# Patient Record
Sex: MALE | Race: WHITE | Employment: FULL TIME | URBAN - METROPOLITAN AREA
[De-identification: names, ages, dates, MRNs, and addresses within clinical notes are randomized per-mention and may not be internally consistent; named-entity substitution may affect disease eponyms.]

---

## 2017-10-24 ENCOUNTER — HOSPITAL ENCOUNTER (EMERGENCY)
Age: 57
Discharge: HOME | End: 2017-10-24
Attending: EMERGENCY MEDICINE
Payer: COMMERCIAL

## 2017-10-24 VITALS
BODY MASS INDEX: 39.37 KG/M2 | HEART RATE: 60 BPM | HEIGHT: 70 IN | DIASTOLIC BLOOD PRESSURE: 79 MMHG | OXYGEN SATURATION: 99 % | RESPIRATION RATE: 18 BRPM | WEIGHT: 275 LBS | SYSTOLIC BLOOD PRESSURE: 149 MMHG | TEMPERATURE: 98.1 F

## 2017-10-24 DIAGNOSIS — M62.838 SPASM OF MUSCLE: ICD-10-CM

## 2017-10-24 DIAGNOSIS — S39.012A BACK STRAIN, INITIAL ENCOUNTER: Primary | ICD-10-CM

## 2017-10-24 PROCEDURE — 99283 EMERGENCY DEPT VISIT LOW MDM: CPT

## 2017-10-24 PROCEDURE — 6360000002 HC RX W HCPCS: Performed by: PHYSICIAN ASSISTANT

## 2017-10-24 PROCEDURE — 96372 THER/PROPH/DIAG INJ SC/IM: CPT

## 2017-10-24 RX ORDER — PREDNISONE 20 MG/1
40 TABLET ORAL DAILY
Qty: 10 TABLET | Refills: 0 | Status: SHIPPED | OUTPATIENT
Start: 2017-10-24 | End: 2017-10-29

## 2017-10-24 RX ORDER — CYCLOBENZAPRINE HCL 10 MG
10 TABLET ORAL 2 TIMES DAILY PRN
Qty: 10 TABLET | Refills: 0 | Status: SHIPPED | OUTPATIENT
Start: 2017-10-24 | End: 2017-10-29

## 2017-10-24 RX ORDER — ORPHENADRINE CITRATE 30 MG/ML
60 INJECTION INTRAMUSCULAR; INTRAVENOUS ONCE
Status: COMPLETED | OUTPATIENT
Start: 2017-10-24 | End: 2017-10-24

## 2017-10-24 RX ORDER — KETOROLAC TROMETHAMINE 30 MG/ML
60 INJECTION, SOLUTION INTRAMUSCULAR; INTRAVENOUS ONCE
Status: COMPLETED | OUTPATIENT
Start: 2017-10-24 | End: 2017-10-24

## 2017-10-24 RX ADMIN — KETOROLAC TROMETHAMINE 60 MG: 30 INJECTION, SOLUTION INTRAMUSCULAR at 19:43

## 2017-10-24 RX ADMIN — ORPHENADRINE CITRATE 60 MG: 30 INJECTION INTRAMUSCULAR; INTRAVENOUS at 19:44

## 2017-10-24 ASSESSMENT — ENCOUNTER SYMPTOMS
BACK PAIN: 1
BOWEL INCONTINENCE: 0
ABDOMINAL SWELLING: 0

## 2017-10-24 ASSESSMENT — PAIN SCALES - GENERAL
PAINLEVEL_OUTOF10: 8
PAINLEVEL_OUTOF10: 5

## 2017-10-24 NOTE — ED PROVIDER NOTES
[address]  eMERGENCY dEPARTMENT eNCOUnter      Pt Name: Jessica Roland  MRN: [id number]  [place] 1960  Date of evaluation: 10/24/17      CHIEF COMPLAINT       Chief Complaint   Patient presents with    Back Pain         HISTORY OF PRESENT ILLNESS    Jessica Roland is a 61 y.o. male who presents complaining of back pain    Back Pain   Location:  Sacro-iliac joint  Quality:  Aching and stiffness  Stiffness is present:  All day  Radiates to:  Does not radiate  Pain severity:  Mild  Pain is:  Same all the time  Onset quality:  Sudden  Duration:  1 day (Patient reports he was bending over to get laundry out of the dryer he stood up he felt a sharp pain that almost took him to his knees)  Timing:  Constant  Progression:  Unchanged  Chronicity:  New  Context: twisting    Relieved by:  Being still  Worsened by: Movement  Ineffective treatments:  None tried  Associated symptoms: no abdominal swelling, no bladder incontinence, no bowel incontinence, no dysuria, no leg pain, no numbness, no paresthesias, no pelvic pain, no perianal numbness, no tingling and no weakness        REVIEW OF SYSTEMS       Review of Systems   Gastrointestinal: Negative for bowel incontinence. Genitourinary: Negative for bladder incontinence, dysuria and pelvic pain. Musculoskeletal: Positive for back pain. Neurological: Negative for tingling, weakness, numbness and paresthesias. All other systems reviewed and are negative.       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    Hyperlipidemia     Hypertension        SURGICAL HISTORY       Past Surgical History:   Procedure Laterality Date    FOOT SURGERY Bilateral     bone spurs, ingrown toenails    HERNIA REPAIR      umbilical    LARYNGOSCOPY  09/23/2014    with biopsies of vocal cords    OTHER SURGICAL HISTORY  10-29-14    direct laryngoscopy    TONSILLECTOMY         CURRENT MEDICATIONS       Discharge Medication List as of 10/24/2017  7:54 PM      CONTINUE these medications which have NOT CHANGED    Details   aspirin 325 MG EC tablet Take 325 mg by mouth dailyHistorical Med      minocycline (MINOCIN;DYNACIN) 100 MG capsule Take 1 capsule by mouth 2 times daily. , Disp-60 capsule, R-3Normal      prochlorperazine (COMPAZINE) 10 MG tablet Take 1 tablet by mouth every 6 hours as needed. , Disp-60 tablet, R-1Normal      Multiple Vitamins-Minerals (THERAPEUTIC MULTIVITAMIN-MINERALS) tablet Take 1 tablet by mouth daily. Historical Med      allopurinol (ZYLOPRIM) 300 MG tablet Take 300 mg by mouth daily. Historical Med      atenolol-chlorthalidone (TENORETIC) 100-25 MG per tablet Take 1 tablet by mouth daily. Historical Med      simvastatin (ZOCOR) 40 MG tablet Take 40 mg by mouth nightly. Historical Med             ALLERGIES     is allergic to ceftin [cefuroxime axetil]; pcn [penicillins]; and sulfa antibiotics. FAMILY HISTORY     indicated that his mother is . He indicated that his father is . SOCIAL HISTORY      reports that he has quit smoking. He quit after 15.00 years of use. He has never used smokeless tobacco. He reports that he drinks alcohol. He reports that he does not use drugs. PHYSICAL EXAM     INITIAL VITALS: BP (!) 149/79   Pulse 60   Temp 98.1 °F (36.7 °C) (Oral)   Resp 18   Ht 5' 10\" (1.778 m)   Wt 275 lb (124.7 kg)   SpO2 99%   BMI 39.46 kg/m²      Physical Exam   Constitutional: He is oriented to person, place, and time. He appears well-developed and well-nourished. HENT:   Head: Normocephalic and atraumatic. Cardiovascular: Normal rate, regular rhythm and normal heart sounds. Pulmonary/Chest: Effort normal and breath sounds normal.   Musculoskeletal: He exhibits no edema, tenderness or deformity. Lumbar back: He exhibits decreased range of motion, pain and spasm.  He exhibits no tenderness, no bony tenderness, no swelling and no edema. Back:    Strength is 55 against resistance distally  Reflexes are 2+ bilaterally distally  Sensation is intact distally  Peripheral pulses are palpable distally   Neurological: He is alert and oriented to person, place, and time. MEDICAL DECISION MAKING:         DIAGNOSTIC RESULTS     EKG: All EKG's are interpreted by the Emergency Department Physician who either signs or Co-signs this chart in the absence of a cardiologist.        RADIOLOGY:All plain film, CT, MRI, and formal ultrasound images (except ED bedside ultrasound) are read by the radiologist and the images and interpretations are directly viewed by the emergency physician. LABS: All lab results were reviewed by myself, and all abnormals are listed below. Labs Reviewed - No data to display      EMERGENCY DEPARTMENT COURSE:   Vitals:    Vitals:    10/24/17 1923 10/24/17 1938   BP: (!) 141/79 (!) 149/79   Pulse: 60 60   Resp: 18    Temp: 98.1 °F (36.7 °C)    TempSrc: Oral    SpO2: 99%    Weight: 275 lb (124.7 kg)    Height: 5' 10\" (1.778 m)        The patient was given the following medications while in the emergency department:  Orders Placed This Encounter   Medications    ketorolac (TORADOL) injection 60 mg    orphenadrine (NORFLEX) injection 60 mg    traMADol-acetaminophen (ULTRACET) 37.5-325 MG per tablet     Sig: Take 1 tablet by mouth every 6 hours as needed for Pain     Dispense:  10 tablet     Refill:  0    predniSONE (DELTASONE) 20 MG tablet     Sig: Take 2 tablets by mouth daily for 5 days     Dispense:  10 tablet     Refill:  0    cyclobenzaprine (FLEXERIL) 10 MG tablet     Sig: Take 1 tablet by mouth 2 times daily as needed for Muscle spasms     Dispense:  10 tablet     Refill:  0       -------------------------      CRITICAL CARE:     CONSULTS:  None    PROCEDURES:  Procedures    FINAL IMPRESSION      1. Back strain, initial encounter    2.  Spasm of muscle          DISPOSITION/PLAN   DISPOSITION Decision to Discharge    PATIENT REFERRED TO:  Stanley Fleming MD  [address]  [phone]    Schedule an appointment as soon as possible for a visit in 2 days  As needed    [facility]  [address] [facility]  [phone]    If symptoms worsen      DISCHARGE MEDICATIONS:  Discharge Medication List as of 10/24/2017  7:54 PM      START taking these medications    Details   traMADol-acetaminophen (ULTRACET) 37.5-325 MG per tablet Take 1 tablet by mouth every 6 hours as needed for Pain, Disp-10 tablet, R-0Print      predniSONE (DELTASONE) 20 MG tablet Take 2 tablets by mouth daily for 5 days, Disp-10 tablet, R-0Print      cyclobenzaprine (FLEXERIL) 10 MG tablet Take 1 tablet by mouth 2 times daily as needed for Muscle spasms, Disp-10 tablet, R-0Print             (Please note that portions of this note were completed with a voice recognition program.  Efforts were made to edit the dictations but occasionally words are mis-transcribed.)    ELEANOR Lizarraga PA-C  10/24/17 [id number]

## 2017-10-25 NOTE — ED NOTES
Pt discharged on stable condition. Educated on RX's and discharge instructions reviewed. Pt educated to follow up as recommended. Pt discharged accompanied by family. Pt ambulating well -stable gait. Rates pain to back 3/10.  Pt verbalized understanding to discharge instructions       Amita Samson RN  10/24/17 2008

## 2017-10-25 NOTE — ED PROVIDER NOTES
[address] ED  eMERGENCY dEPARTMENT eNCOUnter   Independent Attestation     Pt Name: Starr Gee  MRN: [id number]  [place] 1960  Date of evaluation: 10/24/17     Starr Gee is a 61 y.o. male with CC: Back Pain      Based on the medical record the care appears appropriate. I was personally available for consultation in the Emergency Department.     Deandra Harper MD  Attending Emergency Physician                 Deandra Harper MD  10/24/17 1012

## 2019-12-02 ENCOUNTER — HOSPITAL ENCOUNTER (EMERGENCY)
Age: 59
Discharge: HOME | End: 2019-12-02
Attending: EMERGENCY MEDICINE
Payer: COMMERCIAL

## 2019-12-02 ENCOUNTER — APPOINTMENT (OUTPATIENT)
Dept: GENERAL RADIOLOGY | Age: 59
End: 2019-12-02
Payer: COMMERCIAL

## 2019-12-02 VITALS
SYSTOLIC BLOOD PRESSURE: 155 MMHG | HEART RATE: 65 BPM | RESPIRATION RATE: 16 BRPM | HEIGHT: 70 IN | BODY MASS INDEX: 41.52 KG/M2 | OXYGEN SATURATION: 95 % | WEIGHT: 290 LBS | TEMPERATURE: 97.7 F | DIASTOLIC BLOOD PRESSURE: 69 MMHG

## 2019-12-02 DIAGNOSIS — J40 BRONCHITIS: Primary | ICD-10-CM

## 2019-12-02 LAB
ABSOLUTE EOS #: 0.17 K/UL (ref 0–0.4)
ABSOLUTE IMMATURE GRANULOCYTE: ABNORMAL K/UL (ref 0–0.3)
ABSOLUTE LYMPH #: 0.83 K/UL (ref 1–4.8)
ABSOLUTE MONO #: 0.88 K/UL (ref 0.1–1.3)
ANION GAP SERPL CALCULATED.3IONS-SCNC: 16 MMOL/L (ref 9–17)
ATYPICAL LYMPHOCYTE ABSOLUTE COUNT: 0.28 K/UL
ATYPICAL LYMPHOCYTES: 5 %
BASOPHILS # BLD: 0 % (ref 0–2)
BASOPHILS ABSOLUTE: 0 K/UL (ref 0–0.2)
BUN BLDV-MCNC: 24 MG/DL (ref 6–20)
BUN/CREAT BLD: ABNORMAL (ref 9–20)
CALCIUM SERPL-MCNC: 9.5 MG/DL (ref 8.6–10.4)
CHLORIDE BLD-SCNC: 100 MMOL/L (ref 98–107)
CO2: 24 MMOL/L (ref 20–31)
CREAT SERPL-MCNC: 1.52 MG/DL (ref 0.7–1.2)
DIFFERENTIAL TYPE: ABNORMAL
DIRECT EXAM: NORMAL
EOSINOPHILS RELATIVE PERCENT: 3 % (ref 0–4)
GFR AFRICAN AMERICAN: 57 ML/MIN
GFR NON-AFRICAN AMERICAN: 47 ML/MIN
GFR SERPL CREATININE-BSD FRML MDRD: ABNORMAL ML/MIN/{1.73_M2}
GFR SERPL CREATININE-BSD FRML MDRD: ABNORMAL ML/MIN/{1.73_M2}
GLUCOSE BLD-MCNC: 129 MG/DL (ref 70–99)
HCT VFR BLD CALC: 42 % (ref 41–53)
HEMOGLOBIN: 14.6 G/DL (ref 13.5–17.5)
IMMATURE GRANULOCYTES: ABNORMAL %
LYMPHOCYTES # BLD: 15 % (ref 24–44)
Lab: NORMAL
MAGNESIUM: 1.7 MG/DL (ref 1.6–2.6)
MCH RBC QN AUTO: 33.7 PG (ref 26–34)
MCHC RBC AUTO-ENTMCNC: 34.8 G/DL (ref 31–37)
MCV RBC AUTO: 96.9 FL (ref 80–100)
MONOCYTES # BLD: 16 % (ref 1–7)
MORPHOLOGY: ABNORMAL
MORPHOLOGY: ABNORMAL
NRBC AUTOMATED: ABNORMAL PER 100 WBC
PDW BLD-RTO: 14.5 % (ref 11.5–14.9)
PLATELET # BLD: 175 K/UL (ref 150–450)
PLATELET ESTIMATE: ABNORMAL
PMV BLD AUTO: 8.1 FL (ref 6–12)
POTASSIUM SERPL-SCNC: 3.5 MMOL/L (ref 3.7–5.3)
RBC # BLD: 4.34 M/UL (ref 4.5–5.9)
RBC # BLD: ABNORMAL 10*6/UL
SEG NEUTROPHILS: 61 % (ref 36–66)
SEGMENTED NEUTROPHILS ABSOLUTE COUNT: 3.34 K/UL (ref 1.3–9.1)
SODIUM BLD-SCNC: 140 MMOL/L (ref 135–144)
SPECIMEN DESCRIPTION: NORMAL
TROPONIN INTERP: NORMAL
TROPONIN INTERP: NORMAL
TROPONIN T: NORMAL NG/ML
TROPONIN T: NORMAL NG/ML
TROPONIN, HIGH SENSITIVITY: 14 NG/L (ref 0–22)
TROPONIN, HIGH SENSITIVITY: 16 NG/L (ref 0–22)
WBC # BLD: 5.5 K/UL (ref 3.5–11)
WBC # BLD: ABNORMAL 10*3/UL

## 2019-12-02 PROCEDURE — 87804 INFLUENZA ASSAY W/OPTIC: CPT

## 2019-12-02 PROCEDURE — 83735 ASSAY OF MAGNESIUM: CPT

## 2019-12-02 PROCEDURE — 84484 ASSAY OF TROPONIN QUANT: CPT

## 2019-12-02 PROCEDURE — 71046 X-RAY EXAM CHEST 2 VIEWS: CPT

## 2019-12-02 PROCEDURE — 85025 COMPLETE CBC W/AUTO DIFF WBC: CPT

## 2019-12-02 PROCEDURE — 36415 COLL VENOUS BLD VENIPUNCTURE: CPT

## 2019-12-02 PROCEDURE — 93005 ELECTROCARDIOGRAM TRACING: CPT | Performed by: STUDENT IN AN ORGANIZED HEALTH CARE EDUCATION/TRAINING PROGRAM

## 2019-12-02 PROCEDURE — 99285 EMERGENCY DEPT VISIT HI MDM: CPT

## 2019-12-02 PROCEDURE — 80048 BASIC METABOLIC PNL TOTAL CA: CPT

## 2019-12-02 RX ORDER — GUAIFENESIN AND CODEINE PHOSPHATE 100; 10 MG/5ML; MG/5ML
5 SOLUTION ORAL 3 TIMES DAILY PRN
Qty: 1 BOTTLE | Refills: 0 | Status: SHIPPED | OUTPATIENT
Start: 2019-12-02 | End: 2019-12-05

## 2019-12-02 ASSESSMENT — PAIN SCALES - GENERAL: PAINLEVEL_OUTOF10: 6

## 2019-12-03 LAB
EKG ATRIAL RATE: 70 BPM
EKG P AXIS: 31 DEGREES
EKG P-R INTERVAL: 202 MS
EKG Q-T INTERVAL: 392 MS
EKG QRS DURATION: 106 MS
EKG QTC CALCULATION (BAZETT): 423 MS
EKG R AXIS: -87 DEGREES
EKG T AXIS: 56 DEGREES
EKG VENTRICULAR RATE: 70 BPM

## 2019-12-03 PROCEDURE — 93010 ELECTROCARDIOGRAM REPORT: CPT | Performed by: INTERNAL MEDICINE

## 2020-08-26 ENCOUNTER — TELEPHONE (OUTPATIENT)
Dept: ORTHOPEDIC SURGERY | Age: 60
End: 2020-08-26

## 2020-08-28 ENCOUNTER — OFFICE VISIT (OUTPATIENT)
Dept: ORTHOPEDIC SURGERY | Age: 60
End: 2020-08-28
Payer: COMMERCIAL

## 2020-08-28 VITALS — WEIGHT: 290 LBS | BODY MASS INDEX: 41.61 KG/M2

## 2020-08-28 PROCEDURE — G8427 DOCREV CUR MEDS BY ELIG CLIN: HCPCS | Performed by: PHYSICIAN ASSISTANT

## 2020-08-28 PROCEDURE — 3017F COLORECTAL CA SCREEN DOC REV: CPT | Performed by: PHYSICIAN ASSISTANT

## 2020-08-28 PROCEDURE — G8417 CALC BMI ABV UP PARAM F/U: HCPCS | Performed by: PHYSICIAN ASSISTANT

## 2020-08-28 PROCEDURE — 99203 OFFICE O/P NEW LOW 30 MIN: CPT | Performed by: PHYSICIAN ASSISTANT

## 2020-08-28 PROCEDURE — 1036F TOBACCO NON-USER: CPT | Performed by: PHYSICIAN ASSISTANT

## 2020-08-28 ASSESSMENT — ENCOUNTER SYMPTOMS
NAUSEA: 0
VOMITING: 0
COLOR CHANGE: 0

## 2020-08-28 NOTE — PROGRESS NOTES
Patient ID: Charly Ly is a 60 y.o. male. Chief Complaint   Patient presents with    New Patient     Rt knee swollen        HPI  Mr. Filomena Jones is a 26-year-old gentleman who presents for evaluation of acute on chronic right knee pain and swelling. States he has had pain in this knee intermittently over the past several years and has undergone several rounds of cortisone injections however over the past month his pain has significantly worsened. Associated with large amount of swelling. Patient cannot recall any specific injury or trauma prior to the onset of his worsening pain. Pain is most severe to the medial aspect of the right knee and is aggravated by any attempt at weightbearing or walking. He states he has had to significantly decrease his activity level because of this. He denies any knee joint warmth, redness, fever or chills. 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    Hyperlipidemia     Hypertension      Past Surgical History:   Procedure Laterality Date    FOOT SURGERY Bilateral     bone spurs, ingrown toenails    HERNIA REPAIR      umbilical    LARYNGOSCOPY  09/23/2014    with biopsies of vocal cords    OTHER SURGICAL HISTORY  10-29-14    direct laryngoscopy    TONSILLECTOMY       Family History   Problem Relation Age of Onset    Cancer Mother      Social History     Occupational History    Not on file   Tobacco Use    Smoking status: Former Smoker     Years: 15.00    Smokeless tobacco: Never Used   Substance and Sexual Activity    Alcohol use: Not Currently     Comment: socially     Drug use: No    Sexual activity: Not on file        Review of Systems   Constitutional: Negative for chills and fever. Cardiovascular: Negative for chest pain. Gastrointestinal: Negative for nausea and vomiting. Musculoskeletal: Positive for arthralgias (Right knee) and joint swelling. Negative for gait problem. end a prescription for Voltaren 75 mg to be taken twice a day with meals over the course of the next 2 weeks was electronically sent to his pharmacy. An aspiration and cortisone injection which was administered as outlined below. him will be contacted with MRI results and follow-up will be arranged from there. Procedure: Right knee aspiration and injection  An informed verbal consent for the procedure was obtained and risks including, but not limited to: allergy to medications, injection, bleeding, stiffness of joint, recurrence of symptoms, loss of function, swelling, drainage, irrigation, need for surgery and pseudo-septic inflammation, were explained to the patient. Also, discussed was the potential for further injections, irrigation and debridement and surgery. Alternate means of treatment have also been discussed with the patient. Under sterile conditions the superolateral portal left knee is prepped with Betadine and alcohol. Using a 25-gauge needle 4 cc of lidocaine is injected superficially. 16 Cc of clear, straw-colored synovial fluid is aspirated from the knee joint subsequently using a 21-gauge needle 2 cc of Celestone is injected into the left knee. A bandage is applied to the injection site. Patient tolerated procedure well. No orders of the defined types were placed in this encounter. Norman Marie    Please note that this chart was generated using voicerecognition Dragon dictation software. Although every effort was made to ensurethe accuracy of this automated transcription, some errors in transcription may haveoccurred.

## 2020-09-03 ENCOUNTER — HOSPITAL ENCOUNTER (OUTPATIENT)
Dept: MRI IMAGING | Age: 60
Discharge: HOME | End: 2020-09-05
Payer: COMMERCIAL

## 2020-09-03 PROCEDURE — 73721 MRI JNT OF LWR EXTRE W/O DYE: CPT

## 2020-09-11 ENCOUNTER — OFFICE VISIT (OUTPATIENT)
Dept: ORTHOPEDIC SURGERY | Age: 60
End: 2020-09-11
Payer: COMMERCIAL

## 2020-09-11 VITALS — HEIGHT: 70 IN | WEIGHT: 290 LBS | BODY MASS INDEX: 41.52 KG/M2

## 2020-09-11 PROCEDURE — 3017F COLORECTAL CA SCREEN DOC REV: CPT | Performed by: ORTHOPAEDIC SURGERY

## 2020-09-11 PROCEDURE — G8417 CALC BMI ABV UP PARAM F/U: HCPCS | Performed by: ORTHOPAEDIC SURGERY

## 2020-09-11 PROCEDURE — G8427 DOCREV CUR MEDS BY ELIG CLIN: HCPCS | Performed by: ORTHOPAEDIC SURGERY

## 2020-09-11 PROCEDURE — 99213 OFFICE O/P EST LOW 20 MIN: CPT | Performed by: ORTHOPAEDIC SURGERY

## 2020-09-11 PROCEDURE — 1036F TOBACCO NON-USER: CPT | Performed by: ORTHOPAEDIC SURGERY

## 2020-09-11 NOTE — PROGRESS NOTES
Mala Drake M.D.            [address] [place] [address], [address] [facility],[address], [address] [facility]           Dept Phone: [phone]           Dept Fax:  [address] [place]          Dept Phone: [phone]           Dept Fax:  [phone]      Chief Compliant:  Chief Complaint   Patient presents with    Follow-up     MRI results on RT knee        History of Present Illness: This is a 60 y.o. male who presents to the clinic today for evaluation / follow up of right knee pain. Please see Anabella Rivera's previous dictation. Patient had a episode of pain and swelling his right knee does not recall specific injury or trauma. He does do a lot of heavy lifting at work. He was seen by Anabella Bergman in late August and an MRI was obtained last week the MRI is consistent with multidirectional tearing of the posterior horn of the medial meniscus as well as a grade 1 sprain of the MCL and some mild to moderate early severe chondromalacia of the medial femoral condyle. Review of Systems   Constitutional: Negative for fever, chills, sweats. Eyes: Negative for changes in vision, or pain. HENT: Negative for ear ache, epistaxis, or sore throat. Respiratory/Cardio: Negative for Chest pain, palpitations, SOB, or cough. Gastrointestinal: Negative for abdominal pain, N/V/D. Genitourinary: Negative for dysuria, frequency, urgency, or hematuria. Neurological: Negative for headache, numbness, or weakness. Integumentary: Negative for rash, itching, laceration, or abrasion. Musculoskeletal: Positive for Follow-up (MRI results on RT knee)       Physical Exam:  Constitutional: Patient is oriented to person, place, and time. Patient appears well-developed and well nourished.    HENT: Negative otherwise noted  Head: Normocephalic and Atraumatic  Nose: Normal  Eyes: Conjunctivae and EOM are normal  Neck: Normal range of motion Neck supple. Respiratory/Cardio: Effort normal. No respiratory distress. Musculoskeletal: Examination notes the patient is wearing a Breg brace. Within the brace is able to do a Parisa's on him which was indeed positive medially. Major examination unremarkable. Neurological: Patient is alert and oriented to person, place, and time. Normal strenght. No sensory deficit. Skin: Skin is warm and dry  Psychiatric: Behavior is normal. Thought content normal.  Nursing note and vitals reviewed. Labs and Imaging:     XR taken today:  No results found. No orders of the defined types were placed in this encounter. Assessment and Plan:  1. Primary osteoarthritis of right knee    2. Tear of medial meniscus of right knee, current, unspecified tear type, initial encounter            This is a 60 y.o. male who presents to the clinic today for evaluation / follow up of medial meniscus tear right knee. Past History:    Current Outpatient Medications:     aspirin 325 MG EC tablet, Take 325 mg by mouth daily, Disp: , Rfl:     minocycline (MINOCIN;DYNACIN) 100 MG capsule, Take 1 capsule by mouth 2 times daily. , Disp: 60 capsule, Rfl: 3    prochlorperazine (COMPAZINE) 10 MG tablet, Take 1 tablet by mouth every 6 hours as needed. , Disp: 60 tablet, Rfl: 1    Multiple Vitamins-Minerals (THERAPEUTIC MULTIVITAMIN-MINERALS) tablet, Take 1 tablet by mouth daily. , Disp: , Rfl:     allopurinol (ZYLOPRIM) 300 MG tablet, Take 300 mg by mouth daily. , Disp: , Rfl:     simvastatin (ZOCOR) 40 MG tablet, Take 40 mg by mouth nightly., Disp: , Rfl:     atenolol-chlorthalidone (TENORETIC) 100-25 MG per tablet, Take 1 tablet by mouth daily. , Disp: , Rfl:   Allergies   Allergen Reactions    Ceftin [Cefuroxime Axetil]      hives    Pcn [Penicillins]      hives    Sulfa Antibiotics      hives     Social History     Socioeconomic History    Marital status:      Spouse name: Not on file    Number of children: Not on file    Years of education: Not on file    Highest education level: Not on file   Occupational History    Not on file   Social Needs    Financial resource strain: Not on file    Food insecurity     Worry: Not on file     Inability: Not on file    Transportation needs     Medical: Not on file     Non-medical: Not on file   Tobacco Use    Smoking status: Former Smoker     Years: 15.00    Smokeless tobacco: Never Used   Substance and Sexual Activity    Alcohol use: Not Currently     Comment: socially     Drug use: No    Sexual activity: Not on file   Lifestyle    Physical activity     Days per week: Not on file     Minutes per session: Not on file    Stress: Not on file   Relationships    Social connections     Talks on phone: Not on file     Gets together: Not on file     Attends Alevism service: Not on file     Active member of club or organization: Not on file     Attends meetings of clubs or organizations: Not on file     Relationship status: Not on file    Intimate partner violence     Fear of current or ex partner: Not on file     Emotionally abused: Not on file     Physically abused: Not on file     Forced sexual activity: Not on file   Other Topics Concern    Not on file   Social History Narrative    Not on file     Past Medical History:   Diagnosis Date    Arthritis     gout    Cancer ([facility] [address]     left vocal cord    Hyperlipidemia     Hypertension      Past Surgical History:   Procedure Laterality Date    FOOT SURGERY Bilateral     bone spurs, ingrown toenails    HERNIA REPAIR      umbilical    LARYNGOSCOPY  09/23/2014    with biopsies of vocal cords    OTHER SURGICAL HISTORY  10-29-14    direct laryngoscopy    TONSILLECTOMY       Family History   Problem Relation Age of Onset    Cancer Mother    Plan  Patient was advised that he would benefit from arthroscopic evaluation treatment he does wish to proceed.   We

## 2020-09-16 ENCOUNTER — TELEPHONE (OUTPATIENT)
Dept: ORTHOPEDIC SURGERY | Age: 60
End: 2020-09-16

## 2020-09-16 NOTE — TELEPHONE ENCOUNTER
PT desires to know if Dr ADAMS [facility] has already filled out  His part of the disability paperwork. PT was going back to work and wants a call back on the progress.   Ph. [phone]

## 2020-09-16 NOTE — TELEPHONE ENCOUNTER
Called patient and he stated he was seen last Friday in [facility] and gave them the paperwork to have filled out. He would like a call back on the status of the paperwork.

## 2020-09-29 DIAGNOSIS — Z01.818 PRE-OP TESTING: Primary | ICD-10-CM

## 2020-09-29 NOTE — PROGRESS NOTES
Preoperative Instructions:    Stop eating solid foods at midnight the night prior to your surgery. Stop drinking clear liquids at midnight the night prior to your surgery. Arrive at the surgery center (3rd entrance) on ___[id number]____________ by ___3730 wearing a mask.____________. Please stop any blood thinning medications as directed by your surgeon or prescribing physician. Failure to stop certain medications may interfere with your scheduled surgery. These may include: Aspirin, Coumadin, Plavix, NSAIDS (Motrin, Aleve, Advil, Mobic, Celebrex), Eliquis, Pradaxa, Xarelto, Fish oil, and herbal supplements. HOLD ASPIRIN as directed. You may continue the rest of your medications through the night before surgery unless instructed otherwise. Day of surgery please take only the following medication(s) with a small sip of water:Atenolol    Please shower with Hibiclens soap( Preferred) or antibacterial soap the night before and the morning of your surgery. Reminders:  -If you are going home the day of your procedure, you will need a family member or friend to stay during the procedure and drive you home after your procedure. Your  must be 25years of age or older and able to sign off on your discharge instructions.    -If you are going home the same day of your surgery, someone must remain with you for the first 24 hours after your surgery if you receive sedation or anesthesia.      -Please do not wear any jewelery or body piercing the day of surgery

## 2020-10-01 ENCOUNTER — HOSPITAL ENCOUNTER (OUTPATIENT)
Dept: PREADMISSION TESTING | Age: 60
Setting detail: SPECIMEN
Discharge: HOME | End: 2020-10-05
Payer: COMMERCIAL

## 2020-10-01 ENCOUNTER — HOSPITAL ENCOUNTER (OUTPATIENT)
Age: 60
Discharge: HOME | End: 2020-10-01
Payer: COMMERCIAL

## 2020-10-01 LAB
ANION GAP SERPL CALCULATED.3IONS-SCNC: 12 MMOL/L (ref 9–17)
BUN BLDV-MCNC: 21 MG/DL (ref 8–23)
BUN/CREAT BLD: ABNORMAL (ref 9–20)
CALCIUM SERPL-MCNC: 10 MG/DL (ref 8.6–10.4)
CHLORIDE BLD-SCNC: 101 MMOL/L (ref 98–107)
CO2: 27 MMOL/L (ref 20–31)
CREAT SERPL-MCNC: 1.37 MG/DL (ref 0.7–1.2)
EKG ATRIAL RATE: 52 BPM
EKG P AXIS: 42 DEGREES
EKG P-R INTERVAL: 228 MS
EKG Q-T INTERVAL: 446 MS
EKG QRS DURATION: 106 MS
EKG QTC CALCULATION (BAZETT): 414 MS
EKG R AXIS: -73 DEGREES
EKG T AXIS: 64 DEGREES
EKG VENTRICULAR RATE: 52 BPM
GFR AFRICAN AMERICAN: >60 ML/MIN
GFR NON-AFRICAN AMERICAN: 53 ML/MIN
GFR SERPL CREATININE-BSD FRML MDRD: ABNORMAL ML/MIN/{1.73_M2}
GFR SERPL CREATININE-BSD FRML MDRD: ABNORMAL ML/MIN/{1.73_M2}
GLUCOSE BLD-MCNC: 110 MG/DL (ref 70–99)
POTASSIUM SERPL-SCNC: 3.8 MMOL/L (ref 3.7–5.3)
SODIUM BLD-SCNC: 140 MMOL/L (ref 135–144)

## 2020-10-01 PROCEDURE — 93005 ELECTROCARDIOGRAM TRACING: CPT | Performed by: ANESTHESIOLOGY

## 2020-10-01 PROCEDURE — 80048 BASIC METABOLIC PNL TOTAL CA: CPT

## 2020-10-01 PROCEDURE — 36415 COLL VENOUS BLD VENIPUNCTURE: CPT

## 2020-10-01 PROCEDURE — U0003 INFECTIOUS AGENT DETECTION BY NUCLEIC ACID (DNA OR RNA); SEVERE ACUTE RESPIRATORY SYNDROME CORONAVIRUS 2 (SARS-COV-2) (CORONAVIRUS DISEASE [COVID-19]), AMPLIFIED PROBE TECHNIQUE, MAKING USE OF HIGH THROUGHPUT TECHNOLOGIES AS DESCRIBED BY CMS-2020-01-R: HCPCS

## 2020-10-02 ENCOUNTER — ANESTHESIA EVENT (OUTPATIENT)
Dept: OPERATING ROOM | Age: 60
End: 2020-10-02
Payer: COMMERCIAL

## 2020-10-02 LAB — SARS-COV-2, NAA: NOT DETECTED

## 2020-10-05 ENCOUNTER — ANESTHESIA (OUTPATIENT)
Dept: OPERATING ROOM | Age: 60
End: 2020-10-05
Payer: COMMERCIAL

## 2020-10-05 ENCOUNTER — HOSPITAL ENCOUNTER (OUTPATIENT)
Age: 60
Setting detail: OUTPATIENT SURGERY
Discharge: HOME | End: 2020-10-05
Attending: ORTHOPAEDIC SURGERY | Admitting: ORTHOPAEDIC SURGERY
Payer: COMMERCIAL

## 2020-10-05 VITALS
HEIGHT: 70 IN | OXYGEN SATURATION: 97 % | HEART RATE: 57 BPM | RESPIRATION RATE: 10 BRPM | BODY MASS INDEX: 39.88 KG/M2 | DIASTOLIC BLOOD PRESSURE: 82 MMHG | SYSTOLIC BLOOD PRESSURE: 136 MMHG | WEIGHT: 278.6 LBS | TEMPERATURE: 97 F

## 2020-10-05 VITALS — TEMPERATURE: 96.6 F | OXYGEN SATURATION: 94 % | SYSTOLIC BLOOD PRESSURE: 113 MMHG | DIASTOLIC BLOOD PRESSURE: 66 MMHG

## 2020-10-05 PROCEDURE — 2500000003 HC RX 250 WO HCPCS: Performed by: NURSE ANESTHETIST, CERTIFIED REGISTERED

## 2020-10-05 PROCEDURE — 7100000001 HC PACU RECOVERY - ADDTL 15 MIN: Performed by: ORTHOPAEDIC SURGERY

## 2020-10-05 PROCEDURE — 29881 ARTHRS KNE SRG MNISECTMY M/L: CPT | Performed by: ORTHOPAEDIC SURGERY

## 2020-10-05 PROCEDURE — 3600000014 HC SURGERY LEVEL 4 ADDTL 15MIN: Performed by: ORTHOPAEDIC SURGERY

## 2020-10-05 PROCEDURE — 3600000004 HC SURGERY LEVEL 4 BASE: Performed by: ORTHOPAEDIC SURGERY

## 2020-10-05 PROCEDURE — 6370000000 HC RX 637 (ALT 250 FOR IP)

## 2020-10-05 PROCEDURE — 6360000002 HC RX W HCPCS: Performed by: NURSE ANESTHETIST, CERTIFIED REGISTERED

## 2020-10-05 PROCEDURE — 7100000000 HC PACU RECOVERY - FIRST 15 MIN: Performed by: ORTHOPAEDIC SURGERY

## 2020-10-05 PROCEDURE — 7100000011 HC PHASE II RECOVERY - ADDTL 15 MIN: Performed by: ORTHOPAEDIC SURGERY

## 2020-10-05 PROCEDURE — 7100000010 HC PHASE II RECOVERY - FIRST 15 MIN: Performed by: ORTHOPAEDIC SURGERY

## 2020-10-05 PROCEDURE — 6360000002 HC RX W HCPCS

## 2020-10-05 PROCEDURE — 2709999900 HC NON-CHARGEABLE SUPPLY: Performed by: ORTHOPAEDIC SURGERY

## 2020-10-05 PROCEDURE — 6360000002 HC RX W HCPCS: Performed by: ORTHOPAEDIC SURGERY

## 2020-10-05 PROCEDURE — 2580000003 HC RX 258: Performed by: ANESTHESIOLOGY

## 2020-10-05 PROCEDURE — 3700000001 HC ADD 15 MINUTES (ANESTHESIA): Performed by: ORTHOPAEDIC SURGERY

## 2020-10-05 PROCEDURE — 3700000000 HC ANESTHESIA ATTENDED CARE: Performed by: ORTHOPAEDIC SURGERY

## 2020-10-05 RX ORDER — DIPHENHYDRAMINE HYDROCHLORIDE 50 MG/ML
12.5 INJECTION INTRAMUSCULAR; INTRAVENOUS
Status: DISCONTINUED | OUTPATIENT
Start: 2020-10-05 | End: 2020-10-05 | Stop reason: HOSPADM

## 2020-10-05 RX ORDER — SODIUM CHLORIDE 0.9 % (FLUSH) 0.9 %
10 SYRINGE (ML) INJECTION PRN
Status: DISCONTINUED | OUTPATIENT
Start: 2020-10-05 | End: 2020-10-05 | Stop reason: HOSPADM

## 2020-10-05 RX ORDER — DEXAMETHASONE SODIUM PHOSPHATE 10 MG/ML
INJECTION, SOLUTION INTRAMUSCULAR; INTRAVENOUS PRN
Status: DISCONTINUED | OUTPATIENT
Start: 2020-10-05 | End: 2020-10-05 | Stop reason: SDUPTHER

## 2020-10-05 RX ORDER — ROPIVACAINE HYDROCHLORIDE 5 MG/ML
INJECTION, SOLUTION EPIDURAL; INFILTRATION; PERINEURAL PRN
Status: DISCONTINUED | OUTPATIENT
Start: 2020-10-05 | End: 2020-10-05 | Stop reason: ALTCHOICE

## 2020-10-05 RX ORDER — LIDOCAINE HYDROCHLORIDE 10 MG/ML
1 INJECTION, SOLUTION EPIDURAL; INFILTRATION; INTRACAUDAL; PERINEURAL
Status: DISCONTINUED | OUTPATIENT
Start: 2020-10-05 | End: 2020-10-05 | Stop reason: HOSPADM

## 2020-10-05 RX ORDER — SODIUM CHLORIDE 0.9 % (FLUSH) 0.9 %
10 SYRINGE (ML) INJECTION EVERY 12 HOURS SCHEDULED
Status: DISCONTINUED | OUTPATIENT
Start: 2020-10-05 | End: 2020-10-05 | Stop reason: HOSPADM

## 2020-10-05 RX ORDER — MORPHINE SULFATE 1 MG/ML
1 INJECTION, SOLUTION EPIDURAL; INTRATHECAL; INTRAVENOUS EVERY 5 MIN PRN
Status: DISCONTINUED | OUTPATIENT
Start: 2020-10-05 | End: 2020-10-05 | Stop reason: HOSPADM

## 2020-10-05 RX ORDER — ONDANSETRON 2 MG/ML
INJECTION INTRAMUSCULAR; INTRAVENOUS PRN
Status: DISCONTINUED | OUTPATIENT
Start: 2020-10-05 | End: 2020-10-05 | Stop reason: SDUPTHER

## 2020-10-05 RX ORDER — SODIUM CHLORIDE, SODIUM LACTATE, POTASSIUM CHLORIDE, CALCIUM CHLORIDE 600; 310; 30; 20 MG/100ML; MG/100ML; MG/100ML; MG/100ML
INJECTION, SOLUTION INTRAVENOUS CONTINUOUS
Status: DISCONTINUED | OUTPATIENT
Start: 2020-10-05 | End: 2020-10-05 | Stop reason: HOSPADM

## 2020-10-05 RX ORDER — HYDROCODONE BITARTRATE AND ACETAMINOPHEN 5; 325 MG/1; MG/1
1 TABLET ORAL EVERY 4 HOURS PRN
Qty: 30 TABLET | Refills: 0 | Status: SHIPPED | OUTPATIENT
Start: 2020-10-05 | End: 2020-10-10

## 2020-10-05 RX ORDER — HYDRALAZINE HYDROCHLORIDE 20 MG/ML
5 INJECTION INTRAMUSCULAR; INTRAVENOUS EVERY 10 MIN PRN
Status: DISCONTINUED | OUTPATIENT
Start: 2020-10-05 | End: 2020-10-05 | Stop reason: HOSPADM

## 2020-10-05 RX ORDER — PROPOFOL 10 MG/ML
INJECTION, EMULSION INTRAVENOUS PRN
Status: DISCONTINUED | OUTPATIENT
Start: 2020-10-05 | End: 2020-10-05 | Stop reason: SDUPTHER

## 2020-10-05 RX ORDER — FENTANYL CITRATE 50 UG/ML
25 INJECTION, SOLUTION INTRAMUSCULAR; INTRAVENOUS EVERY 5 MIN PRN
Status: DISCONTINUED | OUTPATIENT
Start: 2020-10-05 | End: 2020-10-05 | Stop reason: HOSPADM

## 2020-10-05 RX ORDER — HYDROCODONE BITARTRATE AND ACETAMINOPHEN 5; 325 MG/1; MG/1
1 TABLET ORAL PRN
Status: COMPLETED | OUTPATIENT
Start: 2020-10-05 | End: 2020-10-05

## 2020-10-05 RX ORDER — MEPERIDINE HYDROCHLORIDE 50 MG/ML
12.5 INJECTION INTRAMUSCULAR; INTRAVENOUS; SUBCUTANEOUS EVERY 5 MIN PRN
Status: DISCONTINUED | OUTPATIENT
Start: 2020-10-05 | End: 2020-10-05 | Stop reason: HOSPADM

## 2020-10-05 RX ORDER — HYDROCODONE BITARTRATE AND ACETAMINOPHEN 5; 325 MG/1; MG/1
TABLET ORAL
Status: COMPLETED
Start: 2020-10-05 | End: 2020-10-05

## 2020-10-05 RX ORDER — ROPIVACAINE HYDROCHLORIDE 5 MG/ML
INJECTION, SOLUTION EPIDURAL; INFILTRATION; PERINEURAL
Status: DISCONTINUED
Start: 2020-10-05 | End: 2020-10-05 | Stop reason: HOSPADM

## 2020-10-05 RX ORDER — FENTANYL CITRATE 50 UG/ML
INJECTION, SOLUTION INTRAMUSCULAR; INTRAVENOUS
Status: COMPLETED
Start: 2020-10-05 | End: 2020-10-05

## 2020-10-05 RX ORDER — PROMETHAZINE HYDROCHLORIDE 25 MG/ML
6.25 INJECTION, SOLUTION INTRAMUSCULAR; INTRAVENOUS
Status: DISCONTINUED | OUTPATIENT
Start: 2020-10-05 | End: 2020-10-05 | Stop reason: HOSPADM

## 2020-10-05 RX ORDER — MIDAZOLAM HYDROCHLORIDE 1 MG/ML
INJECTION INTRAMUSCULAR; INTRAVENOUS PRN
Status: DISCONTINUED | OUTPATIENT
Start: 2020-10-05 | End: 2020-10-05 | Stop reason: SDUPTHER

## 2020-10-05 RX ORDER — HYDROCODONE BITARTRATE AND ACETAMINOPHEN 5; 325 MG/1; MG/1
2 TABLET ORAL PRN
Status: COMPLETED | OUTPATIENT
Start: 2020-10-05 | End: 2020-10-05

## 2020-10-05 RX ORDER — FENTANYL CITRATE 50 UG/ML
INJECTION, SOLUTION INTRAMUSCULAR; INTRAVENOUS PRN
Status: DISCONTINUED | OUTPATIENT
Start: 2020-10-05 | End: 2020-10-05 | Stop reason: SDUPTHER

## 2020-10-05 RX ORDER — LIDOCAINE HYDROCHLORIDE 10 MG/ML
INJECTION, SOLUTION EPIDURAL; INFILTRATION; INTRACAUDAL; PERINEURAL PRN
Status: DISCONTINUED | OUTPATIENT
Start: 2020-10-05 | End: 2020-10-05 | Stop reason: SDUPTHER

## 2020-10-05 RX ORDER — ONDANSETRON 2 MG/ML
4 INJECTION INTRAMUSCULAR; INTRAVENOUS
Status: DISCONTINUED | OUTPATIENT
Start: 2020-10-05 | End: 2020-10-05 | Stop reason: HOSPADM

## 2020-10-05 RX ADMIN — FENTANYL CITRATE 50 MCG: 50 INJECTION, SOLUTION INTRAMUSCULAR; INTRAVENOUS at 09:41

## 2020-10-05 RX ADMIN — SODIUM CHLORIDE, POTASSIUM CHLORIDE, SODIUM LACTATE AND CALCIUM CHLORIDE: 600; 310; 30; 20 INJECTION, SOLUTION INTRAVENOUS at 07:51

## 2020-10-05 RX ADMIN — FENTANYL CITRATE 50 MCG: 50 INJECTION INTRAMUSCULAR; INTRAVENOUS at 08:52

## 2020-10-05 RX ADMIN — DEXAMETHASONE SODIUM PHOSPHATE 10 MG: 10 INJECTION, SOLUTION INTRAMUSCULAR; INTRAVENOUS at 08:51

## 2020-10-05 RX ADMIN — MIDAZOLAM HYDROCHLORIDE 2 MG: 1 INJECTION, SOLUTION INTRAMUSCULAR; INTRAVENOUS at 08:37

## 2020-10-05 RX ADMIN — HYDROCODONE BITARTRATE AND ACETAMINOPHEN 1 TABLET: 5; 325 TABLET ORAL at 09:56

## 2020-10-05 RX ADMIN — PROPOFOL 200 MG: 10 INJECTION, EMULSION INTRAVENOUS at 08:41

## 2020-10-05 RX ADMIN — FENTANYL CITRATE 50 MCG: 50 INJECTION INTRAMUSCULAR; INTRAVENOUS at 08:41

## 2020-10-05 RX ADMIN — ONDANSETRON 4 MG: 2 INJECTION, SOLUTION INTRAMUSCULAR; INTRAVENOUS at 09:14

## 2020-10-05 RX ADMIN — LIDOCAINE HYDROCHLORIDE 50 MG: 10 INJECTION, SOLUTION EPIDURAL; INFILTRATION; INTRACAUDAL; PERINEURAL at 08:41

## 2020-10-05 ASSESSMENT — PAIN SCALES - GENERAL
PAINLEVEL_OUTOF10: 2
PAINLEVEL_OUTOF10: 0
PAINLEVEL_OUTOF10: 2
PAINLEVEL_OUTOF10: 0

## 2020-10-05 ASSESSMENT — PULMONARY FUNCTION TESTS
PIF_VALUE: 0
PIF_VALUE: 17
PIF_VALUE: 10
PIF_VALUE: 0
PIF_VALUE: 0
PIF_VALUE: 17
PIF_VALUE: 2
PIF_VALUE: 17
PIF_VALUE: 17
PIF_VALUE: 26
PIF_VALUE: 3
PIF_VALUE: 2
PIF_VALUE: 17
PIF_VALUE: 3
PIF_VALUE: 18
PIF_VALUE: 17
PIF_VALUE: 2
PIF_VALUE: 14
PIF_VALUE: 17
PIF_VALUE: 17
PIF_VALUE: 4
PIF_VALUE: 16
PIF_VALUE: 17
PIF_VALUE: 10
PIF_VALUE: 17
PIF_VALUE: 16
PIF_VALUE: 2
PIF_VALUE: 17
PIF_VALUE: 16
PIF_VALUE: 17
PIF_VALUE: 17
PIF_VALUE: 16
PIF_VALUE: 15
PIF_VALUE: 10

## 2020-10-05 ASSESSMENT — PAIN DESCRIPTION - LOCATION: LOCATION: KNEE

## 2020-10-05 ASSESSMENT — PAIN - FUNCTIONAL ASSESSMENT
PAIN_FUNCTIONAL_ASSESSMENT: 0-10
PAIN_FUNCTIONAL_ASSESSMENT: PREVENTS OR INTERFERES SOME ACTIVE ACTIVITIES AND ADLS

## 2020-10-05 ASSESSMENT — PAIN DESCRIPTION - PAIN TYPE: TYPE: SURGICAL PAIN

## 2020-10-05 ASSESSMENT — PAIN DESCRIPTION - DESCRIPTORS: DESCRIPTORS: CONSTANT

## 2020-10-05 NOTE — ANESTHESIA POSTPROCEDURE EVALUATION
POST- ANESTHESIA EVALUATION       Pt Name: Raina Pinzon  MRN: [id number]  YOB: 1960  Date of evaluation: 10/5/2020  Time:  10:28 AM      /82   Pulse 57   Temp 97 °F (36.1 °C) (Temporal)   Resp 10   Ht 5' 10\" (1.778 m)   Wt 278 lb 9.6 oz (126.4 kg)   SpO2 97%   BMI 39.97 kg/m²      Consciousness Level  Awake  Cardiopulmonary Status  Stable  Pain Adequately Treated YES  Nausea / Vomiting  NO  Adequate Hydration  YES  Anesthesia Related Complications NONE      Electronically signed by Christiano Cali MD on 10/5/2020 at 10:28 AM       Department of Anesthesiology  Postprocedure Note    Patient: Raina Pinzon  MRN: [id number]  YOB: 1960  Date of evaluation: 10/5/2020  Time:  10:28 AM     Procedure Summary     Date:  10/05/20 Room / Location:  [address]    Anesthesia Start:  8955 Anesthesia Stop:  2320    Procedure:  KNEE ARTHROSCOPY RIGHT KNEE WITH PARTIAL MENISCECTOMY (Right Knee) Diagnosis:  (MEDICAL MENISCUS TEAR)    Surgeon:  Jr Marcum MD Responsible Provider:  ALL Sykes CRNA    Anesthesia Type:  general ASA Status:  3          Anesthesia Type: general    Abram Phase I: Abram Score: 9    Abram Phase II: Abram Score: 10    Last vitals: Reviewed and per EMR flowsheets.        Anesthesia Post Evaluation

## 2020-10-05 NOTE — ANESTHESIA PRE PROCEDURE
Department of Anesthesiology  Preprocedure Note       Name:  Christo Ch   Age:  60 y.o.  :  1960                                          MRN:  [id number]         Date:  10/5/2020      Surgeon: Germania Crouch):  Watson Silverio MD    Procedure: Procedure(s):  KNEE ARTHROSCOPY RIGHT KNEE WITH PARTIAL MENISCECTOMY    Medications prior to admission:   Prior to Admission medications    Medication Sig Start Date End Date Taking? Authorizing Provider   aspirin 325 MG EC tablet Take 325 mg by mouth daily   Yes Historical Provider, MD   Multiple Vitamins-Minerals (THERAPEUTIC MULTIVITAMIN-MINERALS) tablet Take 1 tablet by mouth daily. Yes Historical Provider, MD   allopurinol (ZYLOPRIM) 300 MG tablet Take 300 mg by mouth daily. Yes Historical Provider, MD   simvastatin (ZOCOR) 40 MG tablet Take 40 mg by mouth nightly. Yes Historical Provider, MD   atenolol-chlorthalidone (TENORETIC) 100-25 MG per tablet Take 1 tablet by mouth daily. 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 C32.9       Past Medical History:        Diagnosis Date    Arthritis     gout    Cancer ([facility] [address]     left vocal cord/ s/p radiation to vocal cords    Gout     Hyperlipidemia     Hypertension     Obesity        Past Surgical History:        Procedure Laterality Date    FOOT SURGERY Bilateral     bone spurs, ingrown toenails    HERNIA REPAIR      umbilical    LARYNGOSCOPY  2014    with biopsies of vocal cords    OTHER SURGICAL HISTORY  10-29-14    direct laryngoscopy    TONSILLECTOMY Social History:    Social History     Tobacco Use    Smoking status: Former Smoker     Years: 15.00    Smokeless tobacco: Never Used    Tobacco comment: quit 20 years ago   Substance Use Topics    Alcohol use: Not Currently     Comment: socially                                 Counseling given: Not Answered  Comment: quit 20 years ago      Vital Signs (Current):   Vitals:    10/05/20 0731 10/05/20 0752   BP:  (!) 167/93   Pulse:  72   Resp:  20   Temp: 98 °F (36.7 °C)    TempSrc: Temporal    SpO2:  97%   Weight:  278 lb 9.6 oz (126.4 kg)   Height:  5' 10\" (1.778 m)                                              BP Readings from Last 3 Encounters:   10/05/20 (!) 167/93   12/02/19 (!) 155/69   10/24/17 (!) 149/79       NPO Status: Time of last liquid consumption: 0645                        Time of last solid consumption: 2200                        Date of last liquid consumption: 10/05/20                        Date of last solid food consumption: 10/04/20    BMI:   Wt Readings from Last 3 Encounters:   10/05/20 278 lb 9.6 oz (126.4 kg)   09/11/20 290 lb (131.5 kg)   08/28/20 290 lb (131.5 kg)     Body mass index is 39.97 kg/m². CBC:   Lab Results   Component Value Date    WBC 5.5 12/02/2019    RBC 4.34 12/02/2019    HGB 14.6 12/02/2019    HCT 42.0 12/02/2019    MCV 96.9 12/02/2019    RDW 14.5 12/02/2019     12/02/2019       CMP:   Lab Results   Component Value Date     10/01/2020    K 3.8 10/01/2020     10/01/2020    CO2 27 10/01/2020    BUN 21 10/01/2020    CREATININE 1.37 10/01/2020    GFRAA >60 10/01/2020    LABGLOM 53 10/01/2020    GLUCOSE 110 10/01/2020    CALCIUM 10.0 10/01/2020       POC Tests: No results for input(s): POCGLU, POCNA, POCK, POCCL, POCBUN, POCHEMO, POCHCT in the last 72 hours.     Coags: No results found for: PROTIME, INR, APTT    HCG (If Applicable): No results found for: PREGTESTUR, PREGSERUM, HCG, HCGQUANT     ABGs: No results found for: PHART, PO2ART, PBK1BLP, WVT0WWV, BEART, N5MPLGNS     Type & Screen (If Applicable):  No results found for: LABABO, LABRH    Drug/Infectious Status (If Applicable):  No results found for: HIV, HEPCAB    COVID-19 Screening (If Applicable):   Lab Results   Component Value Date    COVID19 Not Detected 10/01/2020         Anesthesia Evaluation  Patient summary reviewed and Nursing notes reviewed no history of anesthetic complications:   Airway: Mallampati: II  TM distance: >3 FB   Neck ROM: full  Mouth opening: > = 3 FB Dental: normal exam         Pulmonary:Negative Pulmonary ROS and normal exam  breath sounds clear to auscultation                             Cardiovascular:  Exercise tolerance: no interval change,   (+) hypertension: no interval change, hyperlipidemia      ECG reviewed  Rhythm: regular  Rate: normal           Beta Blocker:  Dose within 24 Hrs         Neuro/Psych:   Negative Neuro/Psych ROS              GI/Hepatic/Renal:   (+) morbid obesity          Endo/Other:    (+) : arthritis: OA and no interval change. , malignancy/cancer. ROS comment: Laryngeal cancer (HCC) , s/p surgery and radiation Abdominal:           Vascular: negative vascular ROS. Anesthesia Plan      general     ASA 3       Induction: intravenous. Anesthetic plan and risks discussed with patient. Plan discussed with CRNA.                   Luis Vazquez MD   10/5/2020

## 2020-10-05 NOTE — OP NOTE
Operative Note      Patient: Margret High  YOB: 1960  MRN: [id number]    Date of Procedure: 10/5/2020    Pre-Op Diagnosis: MEDICAL MENISCUS TEAR right knee    Post-Op Diagnosis: Same       Procedure(s):  KNEE ARTHROSCOPY RIGHT KNEE WITH PARTIAL MENISCECTOMY    Surgeon(s):  Nicolette Ogden MD    Assistant:   Resident: Vesna Schwarz DO    Anesthesia: General    Estimated Blood Loss (mL): Minimal    Complications: None    Specimens:   * No specimens in log *    Implants:  * No implants in log *      Drains: * No LDAs found *    Findings: Degenerative tear of the posterior horn medial meniscus as well as 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 year old male who presents with a history of pain, locking, and giving away sensations of their right knee. Physical examination was positive for Parisa's examination. MRI was distant with a medial meniscus tear as well as advanced chondromalacia of the medial compartment. Having failed conservative treatment, it was advised that arthroscopic examination and treatment of their knee would be beneficial and consent was obtained with risk and benefits explained to the patient. The patient was taken tot he operative room and general anesthesia was administered. A tourniquet was placed to the operatives lower extremity and then placed in the leg mcgovern. The leg was exsanguinated and the tourniquet inflated to the 300mmHg. The leg was the prepped and draped in the usual sterile fashion. Time-out was called to verify laterality. Medial and lateral portals were made and the scope placed in the lateral portal. The patella femoral joint was examined and noted relatively unremarkable. The scope was then passes down the medial gutter into the medial compartment.  A probe was used to assess the medial meniscus and a tear was identified in the posterior horn and body of the medial meniscus. A partial medial menisectomy was carried ou with basket forceps and then smoothed out with a shaver. Repeat probing of the meniscus found it to be stable. It was also noted to have multiple chondral loose bodies floating around the area including the entire knee joint. The patient also was noted to have exposed bone with grade IV chondromalacia on the medial tibial plateau as well as the overlying femoral condyle. The arthroscope was then passed into the notch of the knee. The ACL was found not to have any significant damage or laxity. The scope was then passed in the lateral compartment. Thorough probing of the lateral meniscus and the articular cartilage did not demonstrate any significant finding that requires surgical intervention    The scope was then passed into the suprapatellar area and the shaver was used to remove any further soft tissue debris. The scope was removed and the knee evacuated of fluid and injected with 20cc of 0.5% ropivacaine. The sterile bulky dressing was applied and the leg then wrapped with a large 6-inch ace bandage from toes to the mid-thigh. The tourniquet was then deflated at less than 30 minutes. The patient was awaken and taken to the PACU in good condition.      Electronically signed by Dory Leal MD on 10/5/2020 at 9:22 AM

## 2020-10-06 NOTE — PROGRESS NOTES
CLINICAL PHARMACY NOTE: MEDS TO [address] Select Patient?: No  Total # of Prescriptions Filled: 1   The following medications were delivered to the patient:  · norco  Total # of Interventions Completed: 0  Time Spent (min): 0    Additional Documentation:

## 2020-10-13 ENCOUNTER — TELEPHONE (OUTPATIENT)
Dept: ORTHOPEDIC SURGERY | Age: 60
End: 2020-10-13

## 2020-10-14 ENCOUNTER — TELEPHONE (OUTPATIENT)
Dept: ORTHOPEDIC SURGERY | Age: 60
End: 2020-10-14

## 2020-10-14 NOTE — TELEPHONE ENCOUNTER
Patient employer [employer] called and told pt that they have conflicting dates for return to work. One for says 11/01/2020 and the other 11/05.     He is asking if you can clarify and refax to Louie and also call to let him know what the expected return to work date should be    10/05/2020 - KNEE ARTHROSCOPY RIGHT KNEE WITH PARTIAL MENISCECTOMY

## 2020-10-16 ENCOUNTER — OFFICE VISIT (OUTPATIENT)
Dept: ORTHOPEDIC SURGERY | Age: 60
End: 2020-10-16

## 2020-10-16 PROCEDURE — 99024 POSTOP FOLLOW-UP VISIT: CPT | Performed by: ORTHOPAEDIC SURGERY

## 2020-10-16 NOTE — PROGRESS NOTES
Patient returns today status post right knee arthroscopy with partial (medial/lateral) meniscectomy. Patient has no major complaints other than expected tightness/swelling with ROM. Sharp/stabbing pain has improved. On exam, portal sites are without redness or drainage. No calf tenderness; negative Yonny's sign. Motion is 0-100 degrees. No significant effusion. Assessment  Status post right knee arthroscopy    Plan  Patient given exercises to perform. Patient given activities/ motions to complete. Continue activities at home. Return to work. RTO PRN. Call with any future problems.

## 2020-10-21 NOTE — TELEPHONE ENCOUNTER
Called and left message for patient, Dr. Bisi Bradley did sign a paper for [facility] of a return to work date of 11/6/20.

## 2020-10-23 ENCOUNTER — TELEPHONE (OUTPATIENT)
Dept: ORTHOPEDIC SURGERY | Age: 60
End: 2020-10-23

## 2020-10-23 NOTE — TELEPHONE ENCOUNTER
Pt said employer has received updated paper work but Arjun Maravilla is asking for a copy. Please fax to   [address] [place] [address] [phone]    Form says Off work 10/05 through 11/05.     He is asking if you can add \"return to work 11/06 with NO restrictions\"

## 2020-11-29 ENCOUNTER — APPOINTMENT (OUTPATIENT)
Dept: GENERAL RADIOLOGY | Age: 60
DRG: 177 | End: 2020-11-29
Payer: COMMERCIAL

## 2020-11-29 ENCOUNTER — HOSPITAL ENCOUNTER (INPATIENT)
Age: 60
LOS: 3 days | Discharge: HOME | DRG: 177 | End: 2020-12-02
Attending: STUDENT IN AN ORGANIZED HEALTH CARE EDUCATION/TRAINING PROGRAM | Admitting: FAMILY MEDICINE
Payer: COMMERCIAL

## 2020-11-29 PROBLEM — U07.1 COVID-19: Status: ACTIVE | Noted: 2020-11-29

## 2020-11-29 LAB
-: ABNORMAL
ABSOLUTE EOS #: 0 K/UL (ref 0–0.4)
ABSOLUTE IMMATURE GRANULOCYTE: ABNORMAL K/UL (ref 0–0.3)
ABSOLUTE LYMPH #: 0.8 K/UL (ref 1–4.8)
ABSOLUTE MONO #: 0.7 K/UL (ref 0.1–1.3)
ALBUMIN SERPL-MCNC: 3.6 G/DL (ref 3.5–5.2)
ALBUMIN/GLOBULIN RATIO: ABNORMAL (ref 1–2.5)
ALP BLD-CCNC: 55 U/L (ref 40–129)
ALT SERPL-CCNC: 58 U/L (ref 5–41)
AMORPHOUS: ABNORMAL
ANION GAP SERPL CALCULATED.3IONS-SCNC: 12 MMOL/L (ref 9–17)
AST SERPL-CCNC: 82 U/L
BACTERIA: ABNORMAL
BASOPHILS # BLD: 2 % (ref 0–2)
BASOPHILS ABSOLUTE: 0.1 K/UL (ref 0–0.2)
BILIRUB SERPL-MCNC: 0.89 MG/DL (ref 0.3–1.2)
BILIRUBIN URINE: NEGATIVE
BUN BLDV-MCNC: 21 MG/DL (ref 8–23)
BUN/CREAT BLD: ABNORMAL (ref 9–20)
C-REACTIVE PROTEIN: 52.9 MG/L (ref 0–5)
CALCIUM SERPL-MCNC: 8.5 MG/DL (ref 8.6–10.4)
CASTS UA: ABNORMAL /LPF
CHLORIDE BLD-SCNC: 90 MMOL/L (ref 98–107)
CO2: 27 MMOL/L (ref 20–31)
COLOR: YELLOW
COMMENT UA: ABNORMAL
CREAT SERPL-MCNC: 1.55 MG/DL (ref 0.7–1.2)
CRYSTALS, UA: ABNORMAL /HPF
DIFFERENTIAL TYPE: ABNORMAL
EOSINOPHILS RELATIVE PERCENT: 0 % (ref 0–4)
EPITHELIAL CELLS UA: ABNORMAL /HPF
FERRITIN: 2427 UG/L (ref 30–400)
FIBRINOGEN: 603 MG/DL (ref 210–530)
GFR AFRICAN AMERICAN: 56 ML/MIN
GFR NON-AFRICAN AMERICAN: 46 ML/MIN
GFR SERPL CREATININE-BSD FRML MDRD: ABNORMAL ML/MIN/{1.73_M2}
GFR SERPL CREATININE-BSD FRML MDRD: ABNORMAL ML/MIN/{1.73_M2}
GLUCOSE BLD-MCNC: 138 MG/DL (ref 70–99)
GLUCOSE URINE: NEGATIVE
HCT VFR BLD CALC: 41 % (ref 41–53)
HEMOGLOBIN: 14.2 G/DL (ref 13.5–17.5)
IMMATURE GRANULOCYTES: ABNORMAL %
KETONES, URINE: NEGATIVE
LACTATE DEHYDROGENASE: 370 U/L (ref 135–225)
LEUKOCYTE ESTERASE, URINE: NEGATIVE
LYMPHOCYTES # BLD: 13 % (ref 24–44)
MAGNESIUM: 1.8 MG/DL (ref 1.6–2.6)
MCH RBC QN AUTO: 33 PG (ref 26–34)
MCHC RBC AUTO-ENTMCNC: 34.5 G/DL (ref 31–37)
MCV RBC AUTO: 95.5 FL (ref 80–100)
MONOCYTES # BLD: 11 % (ref 1–7)
MUCUS: ABNORMAL
NITRITE, URINE: NEGATIVE
NRBC AUTOMATED: ABNORMAL PER 100 WBC
OTHER OBSERVATIONS UA: ABNORMAL
PDW BLD-RTO: 14.7 % (ref 11.5–14.9)
PH UA: 6 (ref 5–8)
PLATELET # BLD: 147 K/UL (ref 150–450)
PLATELET ESTIMATE: ABNORMAL
PMV BLD AUTO: 8.2 FL (ref 6–12)
POTASSIUM SERPL-SCNC: 3 MMOL/L (ref 3.7–5.3)
PROCALCITONIN: 0.26 NG/ML
PROTEIN UA: ABNORMAL
RBC # BLD: 4.29 M/UL (ref 4.5–5.9)
RBC # BLD: ABNORMAL 10*6/UL
RBC UA: ABNORMAL /HPF
RENAL EPITHELIAL, UA: ABNORMAL /HPF
SARS-COV-2, RAPID: DETECTED
SARS-COV-2: ABNORMAL
SARS-COV-2: ABNORMAL
SEG NEUTROPHILS: 74 % (ref 36–66)
SEGMENTED NEUTROPHILS ABSOLUTE COUNT: 4.7 K/UL (ref 1.3–9.1)
SODIUM BLD-SCNC: 129 MMOL/L (ref 135–144)
SOURCE: ABNORMAL
SPECIFIC GRAVITY UA: 1.01 (ref 1–1.03)
TOTAL PROTEIN: 6.7 G/DL (ref 6.4–8.3)
TRICHOMONAS: ABNORMAL
TROPONIN INTERP: ABNORMAL
TROPONIN T: ABNORMAL NG/ML
TROPONIN, HIGH SENSITIVITY: 25 NG/L (ref 0–22)
TURBIDITY: CLEAR
URINE HGB: ABNORMAL
UROBILINOGEN, URINE: NORMAL
WBC # BLD: 6.4 K/UL (ref 3.5–11)
WBC # BLD: ABNORMAL 10*3/UL
WBC UA: ABNORMAL /HPF
YEAST: ABNORMAL

## 2020-11-29 PROCEDURE — XW033E5 INTRODUCTION OF REMDESIVIR ANTI-INFECTIVE INTO PERIPHERAL VEIN, PERCUTANEOUS APPROACH, NEW TECHNOLOGY GROUP 5: ICD-10-PCS | Performed by: INTERNAL MEDICINE

## 2020-11-29 PROCEDURE — 6360000002 HC RX W HCPCS: Performed by: STUDENT IN AN ORGANIZED HEALTH CARE EDUCATION/TRAINING PROGRAM

## 2020-11-29 PROCEDURE — 84145 PROCALCITONIN (PCT): CPT

## 2020-11-29 PROCEDURE — 99285 EMERGENCY DEPT VISIT HI MDM: CPT

## 2020-11-29 PROCEDURE — 84484 ASSAY OF TROPONIN QUANT: CPT

## 2020-11-29 PROCEDURE — 6370000000 HC RX 637 (ALT 250 FOR IP): Performed by: FAMILY MEDICINE

## 2020-11-29 PROCEDURE — 2060000000 HC ICU INTERMEDIATE R&B

## 2020-11-29 PROCEDURE — 85025 COMPLETE CBC W/AUTO DIFF WBC: CPT

## 2020-11-29 PROCEDURE — 6360000002 HC RX W HCPCS: Performed by: INTERNAL MEDICINE

## 2020-11-29 PROCEDURE — 81001 URINALYSIS AUTO W/SCOPE: CPT

## 2020-11-29 PROCEDURE — 85384 FIBRINOGEN ACTIVITY: CPT

## 2020-11-29 PROCEDURE — 2580000003 HC RX 258: Performed by: FAMILY MEDICINE

## 2020-11-29 PROCEDURE — 83615 LACTATE (LD) (LDH) ENZYME: CPT

## 2020-11-29 PROCEDURE — 93005 ELECTROCARDIOGRAM TRACING: CPT | Performed by: STUDENT IN AN ORGANIZED HEALTH CARE EDUCATION/TRAINING PROGRAM

## 2020-11-29 PROCEDURE — 71045 X-RAY EXAM CHEST 1 VIEW: CPT

## 2020-11-29 PROCEDURE — 99253 IP/OBS CNSLTJ NEW/EST LOW 45: CPT | Performed by: INTERNAL MEDICINE

## 2020-11-29 PROCEDURE — 80053 COMPREHEN METABOLIC PANEL: CPT

## 2020-11-29 PROCEDURE — 36415 COLL VENOUS BLD VENIPUNCTURE: CPT

## 2020-11-29 PROCEDURE — 86140 C-REACTIVE PROTEIN: CPT

## 2020-11-29 PROCEDURE — 82728 ASSAY OF FERRITIN: CPT

## 2020-11-29 PROCEDURE — 2580000003 HC RX 258: Performed by: INTERNAL MEDICINE

## 2020-11-29 PROCEDURE — 2500000003 HC RX 250 WO HCPCS: Performed by: INTERNAL MEDICINE

## 2020-11-29 PROCEDURE — U0002 COVID-19 LAB TEST NON-CDC: HCPCS

## 2020-11-29 PROCEDURE — 6370000000 HC RX 637 (ALT 250 FOR IP): Performed by: STUDENT IN AN ORGANIZED HEALTH CARE EDUCATION/TRAINING PROGRAM

## 2020-11-29 PROCEDURE — 83735 ASSAY OF MAGNESIUM: CPT

## 2020-11-29 RX ORDER — SODIUM CHLORIDE 0.9 % (FLUSH) 0.9 %
10 SYRINGE (ML) INJECTION EVERY 12 HOURS SCHEDULED
Status: DISCONTINUED | OUTPATIENT
Start: 2020-11-29 | End: 2020-12-02 | Stop reason: HOSPADM

## 2020-11-29 RX ORDER — ATENOLOL AND CHLORTHALIDONE TABLET 100; 25 MG/1; MG/1
1 TABLET ORAL DAILY
Status: DISCONTINUED | OUTPATIENT
Start: 2020-11-29 | End: 2020-11-29

## 2020-11-29 RX ORDER — DEXAMETHASONE SODIUM PHOSPHATE 4 MG/ML
6 INJECTION, SOLUTION INTRA-ARTICULAR; INTRALESIONAL; INTRAMUSCULAR; INTRAVENOUS; SOFT TISSUE DAILY
Status: DISCONTINUED | OUTPATIENT
Start: 2020-11-30 | End: 2020-12-02 | Stop reason: HOSPADM

## 2020-11-29 RX ORDER — ALLOPURINOL 100 MG/1
300 TABLET ORAL DAILY
Status: DISCONTINUED | OUTPATIENT
Start: 2020-11-29 | End: 2020-12-02 | Stop reason: HOSPADM

## 2020-11-29 RX ORDER — DEXAMETHASONE SODIUM PHOSPHATE 4 MG/ML
2 INJECTION, SOLUTION INTRA-ARTICULAR; INTRALESIONAL; INTRAMUSCULAR; INTRAVENOUS; SOFT TISSUE ONCE
Status: COMPLETED | OUTPATIENT
Start: 2020-11-29 | End: 2020-11-29

## 2020-11-29 RX ORDER — SODIUM CHLORIDE 0.9 % (FLUSH) 0.9 %
10 SYRINGE (ML) INJECTION PRN
Status: DISCONTINUED | OUTPATIENT
Start: 2020-11-29 | End: 2020-12-02 | Stop reason: HOSPADM

## 2020-11-29 RX ORDER — HYDROCHLOROTHIAZIDE 25 MG/1
25 TABLET ORAL DAILY
Status: DISCONTINUED | OUTPATIENT
Start: 2020-11-29 | End: 2020-12-02 | Stop reason: HOSPADM

## 2020-11-29 RX ORDER — ATORVASTATIN CALCIUM 40 MG/1
40 TABLET, FILM COATED ORAL DAILY
Status: DISCONTINUED | OUTPATIENT
Start: 2020-11-29 | End: 2020-12-02 | Stop reason: HOSPADM

## 2020-11-29 RX ORDER — DEXAMETHASONE SODIUM PHOSPHATE 10 MG/ML
4 INJECTION INTRAMUSCULAR; INTRAVENOUS ONCE
Status: COMPLETED | OUTPATIENT
Start: 2020-11-29 | End: 2020-11-29

## 2020-11-29 RX ORDER — ATENOLOL 50 MG/1
100 TABLET ORAL DAILY
Status: DISCONTINUED | OUTPATIENT
Start: 2020-11-29 | End: 2020-12-02 | Stop reason: HOSPADM

## 2020-11-29 RX ORDER — BUDESONIDE AND FORMOTEROL FUMARATE DIHYDRATE 160; 4.5 UG/1; UG/1
2 AEROSOL RESPIRATORY (INHALATION) 2 TIMES DAILY
Status: DISCONTINUED | OUTPATIENT
Start: 2020-11-29 | End: 2020-11-29

## 2020-11-29 RX ORDER — 0.9 % SODIUM CHLORIDE 0.9 %
30 INTRAVENOUS SOLUTION INTRAVENOUS PRN
Status: DISCONTINUED | OUTPATIENT
Start: 2020-11-29 | End: 2020-12-02 | Stop reason: HOSPADM

## 2020-11-29 RX ORDER — ACETAMINOPHEN 325 MG/1
650 TABLET ORAL EVERY 4 HOURS PRN
Status: DISCONTINUED | OUTPATIENT
Start: 2020-11-29 | End: 2020-12-02 | Stop reason: HOSPADM

## 2020-11-29 RX ORDER — BUDESONIDE AND FORMOTEROL FUMARATE DIHYDRATE 160; 4.5 UG/1; UG/1
2 AEROSOL RESPIRATORY (INHALATION) 2 TIMES DAILY
Status: DISCONTINUED | OUTPATIENT
Start: 2020-11-30 | End: 2020-12-02 | Stop reason: HOSPADM

## 2020-11-29 RX ADMIN — DEXAMETHASONE SODIUM PHOSPHATE 4 MG: 10 INJECTION INTRAMUSCULAR; INTRAVENOUS at 10:11

## 2020-11-29 RX ADMIN — ATORVASTATIN CALCIUM 40 MG: 40 TABLET, FILM COATED ORAL at 16:57

## 2020-11-29 RX ADMIN — Medication 2 PUFF: at 22:56

## 2020-11-29 RX ADMIN — POTASSIUM BICARBONATE 20 MEQ: 782 TABLET, EFFERVESCENT ORAL at 09:48

## 2020-11-29 RX ADMIN — ENOXAPARIN SODIUM 30 MG: 30 INJECTION SUBCUTANEOUS at 20:32

## 2020-11-29 RX ADMIN — REMDESIVIR 200 MG: 100 INJECTION, POWDER, LYOPHILIZED, FOR SOLUTION INTRAVENOUS at 16:58

## 2020-11-29 RX ADMIN — Medication 10 ML: at 16:58

## 2020-11-29 RX ADMIN — ENOXAPARIN SODIUM 30 MG: 30 INJECTION SUBCUTANEOUS at 16:58

## 2020-11-29 RX ADMIN — ALLOPURINOL 300 MG: 100 TABLET ORAL at 16:57

## 2020-11-29 RX ADMIN — HYDROCHLOROTHIAZIDE 25 MG: 25 TABLET ORAL at 16:57

## 2020-11-29 RX ADMIN — ATENOLOL 100 MG: 50 TABLET ORAL at 16:57

## 2020-11-29 RX ADMIN — ASPIRIN 325 MG: 325 TABLET, COATED ORAL at 16:57

## 2020-11-29 RX ADMIN — DEXAMETHASONE SODIUM PHOSPHATE 2 MG: 4 INJECTION, SOLUTION INTRAMUSCULAR; INTRAVENOUS at 16:58

## 2020-11-29 RX ADMIN — Medication 10 ML: at 20:33

## 2020-11-29 ASSESSMENT — ENCOUNTER SYMPTOMS
NAUSEA: 0
COLOR CHANGE: 0
COUGH: 0
VOMITING: 0
ABDOMINAL PAIN: 0
BACK PAIN: 0
SHORTNESS OF BREATH: 1
EYE REDNESS: 0
SORE THROAT: 0
EYE PAIN: 0
DIARRHEA: 0
RHINORRHEA: 0
FACIAL SWELLING: 0

## 2020-11-29 ASSESSMENT — PAIN SCALES - GENERAL
PAINLEVEL_OUTOF10: 0
PAINLEVEL_OUTOF10: 0

## 2020-11-29 NOTE — CONSULTS
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 At the ER rapid COVID-19 test was positive, was hypoxic with reported O2 sat of 89% on room air was placed on 4 L of oxygen per nasal cannula initially. Chest x-ray showed patchy infiltrates of the left base    Interval changes  11/29/2020   On 2 L of oxygen per nasal cannula  GFR 46  Patient Vitals for the past 8 hrs:   BP Temp Temp src Pulse Resp SpO2 Height Weight   11/29/20 1245 (!) 153/72 98.2 °F (36.8 °C) Oral 76 16 96 % -- --   11/29/20 1200 -- -- -- -- 18 -- -- --   11/29/20 1015 125/70 -- Oral 75 19 94 % -- --   11/29/20 0911 105/67 -- -- 81 26 95 % -- --   11/29/20 0834 132/68 -- -- 81 27 94 % -- --   11/29/20 0729 (!) 146/85 98.6 °F (37 °C) Oral 89 (!) 32 91 % 5' 10\" (1.778 m) 285 lb (129.3 kg)             I have personally reviewed the past medical history, past surgical history, medications, social history, and family history, and I haveupdated the database accordingly. Allergies:   Ceftin [cefuroxime axetil];  Doxycycline monohydrate; Pcn [penicillins]; and Sulfa antibiotics     Review of Systems:     Review of Systems  As per history of present illness, other 12 system review was negative  Physical Examination : Physical Exam  Due to COVID-19 pandemic my exam was deferred  Past Medical History:     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/ s/p radiation to vocal cords    Gout     Hyperlipidemia     Hypertension     Obesity        Past Surgical  History:     Past Surgical History:   Procedure Laterality Date    FOOT SURGERY Bilateral     bone spurs, ingrown toenails    HERNIA REPAIR      umbilical    KNEE ARTHROSCOPY Right 10/05/2020    partial menisectomy    KNEE ARTHROSCOPY Right 10/5/2020    KNEE ARTHROSCOPY RIGHT KNEE WITH PARTIAL MENISCECTOMY performed by Rosales Villasenor MD at [address]  09/23/2014    with biopsies of vocal cords    OTHER SURGICAL HISTORY  10-29-14    direct laryngoscopy    TONSILLECTOMY         Medications:      sodium chloride flush  10 mL Intravenous 2 times per day    enoxaparin  30 mg Subcutaneous BID       Social History:     Social History     Socioeconomic History    Marital status:      Spouse name: Not on file    Number of children: Not on file    Years of education: Not on file    Highest education level: Not on file   Occupational History    Not on file   Social Needs    Financial resource strain: Not on file    Food insecurity     Worry: Not on file     Inability: Not on file   [employer] needs     Medical: Not on file     Non-medical: Not on file   Tobacco Use    Smoking status: Former Smoker     Packs/day: 1.50     Years: 15.00     Pack years: 22.50    Smokeless tobacco: Never Used    Tobacco comment: quit 20 years ago   Substance and Sexual Activity    Alcohol use: Not Currently     Comment: socially     Drug use: No    Sexual activity: Not on file   Lifestyle    Physical activity     Days per week: Not on file     Minutes per session: Not on file    Stress: Not on file   Relationships    Social connections     Talks on phone: Not on file     Gets together: Not on file     Attends Jain service: Not on file     Active member of club or organization: Not on file     Attends meetings of clubs or organizations: Not on file     Relationship status: Not on file    Intimate partner violence     Fear of current or ex partner: Not on file     Emotionally abused: Not on file     Physically abused: Not on file     Forced sexual activity: Not on file   Other Topics Concern    Not on file   Social History Narrative    Not on file       Family History:     Family History   Problem Relation Age of Onset    Cancer Mother       Medical Decision Making:   I have independently reviewed/ordered the following labs:    CBC with Differential:   Recent Labs     11/29/20  0748   WBC 6.4   HGB 14.2   HCT 41.0   *   LYMPHOPCT 13*   MONOPCT 11*     BMP:  Recent Labs     11/29/20  0748   *   K 3.0*   CL 90*   CO2 27   BUN 21   CREATININE 1.55*   MG 1.8     Hepatic Function Panel:   Recent Labs     11/29/20 0748   PROT 6.7   LABALBU 3.6   BILITOT 0.89   ALKPHOS 55   ALT 58*   AST 82*     No results for input(s): RPR in the last 72 hours. No results for input(s): HIV in the last 72 hours. No results for input(s): BC in the last 72 hours. Lab Results   Component Value Date    CREATININE 1.55 11/29/2020    GLUCOSE 138 11/29/2020       Detailed results: Thank you for allowing us to participate in the care of this patient. Please call with questions. This note is created with the assistance of a speech recognition program.  While intending to generate adocument that actually reflects the content of the visit, the document can still have some errors including those of syntax and sound a like substitutions which may escape proof reading. It such instances, actual meaningcan be extrapolated by contextual diversion.     Cindy President, MD  Office: [phone]  Perfect serve / office [phone]

## 2020-11-29 NOTE — PROGRESS NOTES
Dr. Osmar Lemon notified of consult, new orders received for Remdesivir and labs. Will continue to monitor.

## 2020-11-29 NOTE — ED PROVIDER NOTES
EMERGENCY DEPARTMENT ENCOUNTER    Pt Name: Aldo Gomes  MRN: [id number]  [place] 1960  Date of evaluation: 11/29/20  CHIEF COMPLAINT       Chief Complaint   Patient presents with    Fever    Fatigue    Shortness of Breath    Anorexia    Diarrhea     HISTORY OF PRESENT ILLNESS   HPI  65-year-old male with history of gout hypertension hyperlipidemia presents with chief complaint of shortness of breath. Symptoms have been present for the past week and a half or so. Additionally describes some generalized fatigue decreased appetite. No known sick contacts. Symptoms are moderate and progressive. No chest pain or abdominal pain. No nausea or vomiting. Describes maybe some loose brown watery stools over the past several days. No home treatment prior to arrival.  No other recent illness. REVIEW OF SYSTEMS     Review of Systems   Constitutional: Positive for appetite change and fatigue. Negative for chills. HENT: Negative for facial swelling, nosebleeds, rhinorrhea and sore throat. Eyes: Negative for pain and redness. Respiratory: Positive for shortness of breath. Negative for cough. Cardiovascular: Negative for chest pain and leg swelling. Gastrointestinal: Negative for abdominal pain, diarrhea, nausea and vomiting. Genitourinary: Negative for flank pain and hematuria. Musculoskeletal: Negative for arthralgias and back pain. Skin: Negative for color change and rash. Neurological: Negative for dizziness, tremors, facial asymmetry, speech difficulty, weakness and numbness.      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  2014    with biopsies of vocal cords    OTHER SURGICAL HISTORY  10-29-14    direct laryngoscopy    TONSILLECTOMY       CURRENT MEDICATIONS       Current Discharge Medication List      CONTINUE these medications which have NOT CHANGED    Details   aspirin 325 MG EC tablet Take 325 mg by mouth daily      Multiple Vitamins-Minerals (THERAPEUTIC MULTIVITAMIN-MINERALS) tablet Take 1 tablet by mouth daily. allopurinol (ZYLOPRIM) 300 MG tablet Take 300 mg by mouth daily. simvastatin (ZOCOR) 40 MG tablet Take 40 mg by mouth nightly. atenolol-chlorthalidone (TENORETIC) 100-25 MG per tablet Take 1 tablet by mouth daily. ALLERGIES     is allergic to ceftin [cefuroxime axetil]; doxycycline monohydrate; pcn [penicillins]; and sulfa antibiotics. FAMILY HISTORY     He indicated that his mother is . He indicated that his father is . SOCIAL HISTORY       Social History     Tobacco Use    Smoking status: Former Smoker     Packs/day: 1.50     Years: 15.00     Pack years: 22.50    Smokeless tobacco: Never Used    Tobacco comment: quit 20 years ago   Substance Use Topics    Alcohol use: Not Currently     Comment: socially     Drug use: No     PHYSICAL EXAM     INITIAL VITALS: BP (!) 153/72   Pulse 76   Temp 98.6 °F (37 °C) (Oral)   Resp 16   Ht 5' 10\" (1.778 m)   Wt 285 lb (129.3 kg)   SpO2 96%   BMI 40.89 kg/m²    Physical Exam  Constitutional:       Appearance: Normal appearance. HENT:      Head: Normocephalic and atraumatic.       Nose: Nose normal.   Eyes: below.   Labs Reviewed   CBC WITH AUTO DIFFERENTIAL - Abnormal; Notable for the following components:       Result Value    RBC 4.29 (*)     Platelets 830 (*)     Seg Neutrophils 74 (*)     Lymphocytes 13 (*)     Monocytes 11 (*)     Absolute Lymph # 0.80 (*)     All other components within normal limits   COMPREHENSIVE METABOLIC PANEL W/ REFLEX TO MG FOR LOW K - Abnormal; Notable for the following components:    Glucose 138 (*)     CREATININE 1.55 (*)     Calcium 8.5 (*)     Sodium 129 (*)     Potassium 3.0 (*)     Chloride 90 (*)     ALT 58 (*)     AST 82 (*)     GFR Non- 46 (*)     GFR  56 (*)     All other components within normal limits   TROPONIN - Abnormal; Notable for the following components:    Troponin, High Sensitivity 25 (*)     All other components within normal limits   URINALYSIS - Abnormal; Notable for the following components:    Urine Hgb SMALL (*)     Protein, UA 2+ (*)     All other components within normal limits   FIBRINOGEN - Abnormal; Notable for the following components:    Fibrinogen 603 (*)     All other components within normal limits   PROCALCITONIN - Abnormal; Notable for the following components:    Procalcitonin 0.26 (*)     All other components within normal limits   C-REACTIVE PROTEIN - Abnormal; Notable for the following components:    CRP 52.9 (*)     All other components within normal limits   COVID-19 - Abnormal; Notable for the following components:    SARS-CoV-2, Rapid DETECTED (*)     All other components within normal limits   MICROSCOPIC URINALYSIS - Abnormal; Notable for the following components:    Bacteria, UA FEW (*)     All other components within normal limits   MAGNESIUM       EMERGENCY DEPARTMENTCOURSE:         Vitals:    Vitals:    11/29/20 0911 11/29/20 1015 11/29/20 1200 11/29/20 1245   BP: 105/67 125/70  (!) 153/72   Pulse: 81 75  76   Resp: 26 19 18 16   Temp:       TempSrc:  Oral     SpO2: 95% 94%  96%   Weight:       Height:

## 2020-11-29 NOTE — PROGRESS NOTES
Dr. Asher Lopez notified of consult. New orders for Decadron IV 6mg daily. Give extra 2mg IV now. Awaiting infectious diseases input and call with any questions or concerns.

## 2020-11-30 LAB
ANION GAP SERPL CALCULATED.3IONS-SCNC: 11 MMOL/L (ref 9–17)
BUN BLDV-MCNC: 24 MG/DL (ref 8–23)
BUN/CREAT BLD: ABNORMAL (ref 9–20)
CALCIUM SERPL-MCNC: 9.3 MG/DL (ref 8.6–10.4)
CHLORIDE BLD-SCNC: 95 MMOL/L (ref 98–107)
CO2: 30 MMOL/L (ref 20–31)
CREAT SERPL-MCNC: 1.44 MG/DL (ref 0.7–1.2)
D-DIMER QUANTITATIVE: 0.61 MG/L FEU (ref 0–0.59)
EKG ATRIAL RATE: 81 BPM
EKG P AXIS: 23 DEGREES
EKG P-R INTERVAL: 184 MS
EKG Q-T INTERVAL: 396 MS
EKG QRS DURATION: 110 MS
EKG QTC CALCULATION (BAZETT): 460 MS
EKG R AXIS: -139 DEGREES
EKG T AXIS: 14 DEGREES
EKG VENTRICULAR RATE: 81 BPM
GFR AFRICAN AMERICAN: >60 ML/MIN
GFR NON-AFRICAN AMERICAN: 50 ML/MIN
GFR SERPL CREATININE-BSD FRML MDRD: ABNORMAL ML/MIN/{1.73_M2}
GFR SERPL CREATININE-BSD FRML MDRD: ABNORMAL ML/MIN/{1.73_M2}
GLUCOSE BLD-MCNC: 190 MG/DL (ref 70–99)
POTASSIUM SERPL-SCNC: 4.2 MMOL/L (ref 3.7–5.3)
SODIUM BLD-SCNC: 136 MMOL/L (ref 135–144)

## 2020-11-30 PROCEDURE — 80048 BASIC METABOLIC PNL TOTAL CA: CPT

## 2020-11-30 PROCEDURE — 2580000003 HC RX 258: Performed by: INTERNAL MEDICINE

## 2020-11-30 PROCEDURE — 6360000002 HC RX W HCPCS: Performed by: INTERNAL MEDICINE

## 2020-11-30 PROCEDURE — 2060000000 HC ICU INTERMEDIATE R&B

## 2020-11-30 PROCEDURE — 99232 SBSQ HOSP IP/OBS MODERATE 35: CPT | Performed by: INTERNAL MEDICINE

## 2020-11-30 PROCEDURE — 94761 N-INVAS EAR/PLS OXIMETRY MLT: CPT

## 2020-11-30 PROCEDURE — 2700000000 HC OXYGEN THERAPY PER DAY

## 2020-11-30 PROCEDURE — 2500000003 HC RX 250 WO HCPCS: Performed by: INTERNAL MEDICINE

## 2020-11-30 PROCEDURE — 36415 COLL VENOUS BLD VENIPUNCTURE: CPT

## 2020-11-30 PROCEDURE — 2580000003 HC RX 258: Performed by: FAMILY MEDICINE

## 2020-11-30 PROCEDURE — 85379 FIBRIN DEGRADATION QUANT: CPT

## 2020-11-30 PROCEDURE — 6360000002 HC RX W HCPCS: Performed by: STUDENT IN AN ORGANIZED HEALTH CARE EDUCATION/TRAINING PROGRAM

## 2020-11-30 PROCEDURE — 6370000000 HC RX 637 (ALT 250 FOR IP): Performed by: FAMILY MEDICINE

## 2020-11-30 RX ADMIN — BUDESONIDE AND FORMOTEROL FUMARATE DIHYDRATE 2 PUFF: 160; 4.5 AEROSOL RESPIRATORY (INHALATION) at 08:31

## 2020-11-30 RX ADMIN — DEXAMETHASONE SODIUM PHOSPHATE 6 MG: 4 INJECTION, SOLUTION INTRAMUSCULAR; INTRAVENOUS at 08:31

## 2020-11-30 RX ADMIN — ATORVASTATIN CALCIUM 40 MG: 40 TABLET, FILM COATED ORAL at 08:31

## 2020-11-30 RX ADMIN — Medication 10 ML: at 09:18

## 2020-11-30 RX ADMIN — ENOXAPARIN SODIUM 30 MG: 30 INJECTION SUBCUTANEOUS at 21:13

## 2020-11-30 RX ADMIN — ATENOLOL 100 MG: 50 TABLET ORAL at 08:31

## 2020-11-30 RX ADMIN — HYDROCHLOROTHIAZIDE 25 MG: 25 TABLET ORAL at 08:31

## 2020-11-30 RX ADMIN — Medication 10 ML: at 21:13

## 2020-11-30 RX ADMIN — BUDESONIDE AND FORMOTEROL FUMARATE DIHYDRATE 2 PUFF: 160; 4.5 AEROSOL RESPIRATORY (INHALATION) at 21:14

## 2020-11-30 RX ADMIN — ENOXAPARIN SODIUM 30 MG: 30 INJECTION SUBCUTANEOUS at 08:31

## 2020-11-30 RX ADMIN — REMDESIVIR 100 MG: 100 INJECTION, POWDER, LYOPHILIZED, FOR SOLUTION INTRAVENOUS at 15:36

## 2020-11-30 RX ADMIN — ASPIRIN 325 MG: 325 TABLET, COATED ORAL at 08:31

## 2020-11-30 RX ADMIN — ALLOPURINOL 300 MG: 100 TABLET ORAL at 08:31

## 2020-11-30 ASSESSMENT — PAIN SCALES - GENERAL
PAINLEVEL_OUTOF10: 0

## 2020-11-30 NOTE — ACP (ADVANCE CARE PLANNING)
Advance Care Planning     Advance Care Planning Activator (Inpatient)  Conversation Note      Date of ACP Conversation: 11/29/2020    Conversation Conducted with: Patient with Decision Making Capacity    ACP Activator: Batsheva Conner makes decisions on behalf of the incapacitated patient: Decision Maker is asked to consider and make decisions based on patient values, known preferences, or best interests. Health Care Decision Maker:     Current Designated Health Care Decision Maker:   (If there is a valid [address] named in the [address] [place] [address] Makers\" box in the ACP activity, but it is not visible above, be sure to open that field and then select the health care decision maker relationship (ie \"primary\") in the blank space to the right of the name.) Validate  this information as still accurate & up-to-date; edit [address] field as needed.)    Note: Assess and validate information in current ACP documents, as indicated. If no Decision Maker listed above or available through scanned documents, then:    If no Authorized Decision Maker has previously been identified, then patient chooses [address]:  \"Who would you like to name as your primary health care decision-maker? \"               Name: Andreina Olivarez        Relationship: spouse          Phone number: [phone]  Briana Prost this person be reached easily? \" Yes    Note: If the relationship of these Decision-Makers to the patient does NOT follow your state's Next of Kin hierarchy, recommend that patient complete ACP document that meets state-specific requirements to allow them to act on the patient's behalf when appropriate. Care Preferences    Ventilation:   \"If you were in your present state of health and suddenly became very ill and were unable to breathe on your own, what would your preference be about the use of a ventilator (breathing machine) if it were available to you?\"      Would the patient desire the use of ventilator (breathing machine)?: yes    \"If your health worsens and it becomes clear that your chance of recovery is unlikely, what would your preference be about the use of a ventilator (breathing machine) if it were available to you? \"     Would the patient desire the use of ventilator (breathing machine)?: No      Resuscitation  \"CPR works best to restart the heart when there is a sudden event, like a heart attack, in someone who is otherwise healthy. Unfortunately, CPR does not typically restart the heart for people who have serious health conditions or who are very sick. \"    \"In the event your heart stopped as a result of an underlying serious health condition, would you want attempts to be made to restart your heart (answer \"yes\" for attempt to resuscitate) or would you prefer a natural death (answer \"no\" for do not attempt to resuscitate)? \" yes      NOTE: If the patient has a valid advance directive AND now provides care preference(s) that are inconsistent with that prior directive, advise the patient to consider either: creating a new advance directive that complies with state-specific requirements; or, if that is not possible, orally revoking that prior directive in accordance with state-specific requirements, which must be documented in the EHR. [] Yes   [] No   Educated Patient / Bettina Martinez regarding differences between Advance Directives and portable DNR orders.     Length of ACP Conversation in minutes:      Conversation Outcomes:  [x] ACP discussion completed  [] Existing advance directive reviewed with patient; no changes to patient's previously recorded wishes  [] New Advance Directive completed  [] Portable Do Not Rescitate prepared for Provider review and signature  [] POLST/POST/MOLST/MOST prepared for Provider review and signature      Follow-up plan:    [] Schedule follow-up conversation to continue planning  [] Referred individual to Provider for additional questions/concerns   [] Advised patient/agent/surrogate to review completed ACP document and update if needed with changes in condition, patient preferences or care setting    [] This note routed to one or more involved healthcare providers

## 2020-11-30 NOTE — H&P
[facility]        History and Physical Examination     CHIEF COMPLAINT: SOB    Reason for Admission: COVID 19 PNA and Hypoxia  History Obtained From:  Patient     HISTORY OF PRESENT ILLNESS:      The patient is a  60 y.o. male with significant medical history of Vocal cord CA/HTN/HLP/Gout/Obesity who has had Fever/Fatigue/SOB/Anorexia/Diarrhea for the last 3-4 days and he presented to ER and was found to have COVID PNA and Hypoxia. Pt was admitted for treatment. This am pt feels significantly better and has none of the above Sx's, he is still requiring 3 L of O2 per nc to keep his O2 above 90 %. Past Medical History:        Diagnosis Date    Arthritis     gout    Cancer ([facility] [address]     left vocal cord/ s/p radiation to vocal cords    Gout     Hyperlipidemia     Hypertension     Obesity        Past Surgical History:        Procedure Laterality Date    FOOT SURGERY Bilateral     bone spurs, ingrown toenails    HERNIA REPAIR      umbilical    KNEE ARTHROSCOPY Right 10/05/2020    partial menisectomy    KNEE ARTHROSCOPY Right 10/5/2020    KNEE ARTHROSCOPY RIGHT KNEE WITH PARTIAL MENISCECTOMY performed by Delio Pan MD at [address]  09/23/2014    with biopsies of vocal cords    OTHER SURGICAL HISTORY  10-29-14    direct laryngoscopy    TONSILLECTOMY         Medications Prior to Admission:    Medications Prior to Admission: aspirin 325 MG EC tablet, Take 325 mg by mouth daily  Multiple Vitamins-Minerals (THERAPEUTIC MULTIVITAMIN-MINERALS) tablet, Take 1 tablet by mouth daily. allopurinol (ZYLOPRIM) 300 MG tablet, Take 300 mg by mouth daily. simvastatin (ZOCOR) 40 MG tablet, Take 40 mg by mouth nightly. atenolol-chlorthalidone (TENORETIC) 100-25 MG per tablet, Take 1 tablet by mouth daily. Allergies:  Ceftin [cefuroxime axetil];  Doxycycline monohydrate; Pcn [penicillins]; and Sulfa antibiotics    Immunizations:   Immunization History   Administered Date(s) Administered    Influenza Virus Vaccine 10/09/2014         Social History:  Former smoker,occ ETOH      Family History:       Problem Relation Age of Onset    Cancer Mother        REVIEW OF SYSTEMS:  Constitutional:  negative for fevers, chills, sweats, fatigue, weight loss  HEENT:  negative for vision, hearing changes, runny nose, throat pain  Respiratory:  negative for shortness of breath, cough, congestion  Cardiovascular:  negative for chest pain, palpitations  Gastrointestinal:  negative for nausea, vomiting, diarrhea, constipation, abdominal pain  Genitourinary:  negative for frequency, dysuria  Integument/Breast:  negative for rash, skin lesions  Musculoskeletal:  negative for muscle aches, joint pain  Neurological:  negative for headaches, dizziness, lightheadedness, numbness, pain, tingling extremities  Behavior/Psych:  negative for depression, anxiety    Vitals:  BP (!) 146/86   Pulse 63   Temp 98.2 °F (36.8 °C) (Oral)   Resp 16   Ht 5' 10\" (1.778 m)   Wt 285 lb (129.3 kg)   SpO2 94%   BMI 40.89 kg/m²     PHYSICAL EXAM:  General appearance - alert, well appearing, and in no acute distress  Mental status - oriented to person, place, and time with normal affect  Head - normocephalic and atraumatic  Eyes - pupils equal and reactive, extraocular eye movements intact, conjunctiva clear  Ears - hearing appears to be intact  Nose - no drainage noted  Mouth - mucous membranes moist  Neck - supple, no carotid bruits, thyroid not palpable  Chest - clear to auscultation, normal effort, coarse BS, no w/r, few ronchi  Heart - normal rate, regular rhythm, no murmur  Abdomen - soft, nontender, nondistended, bowel sounds present all four quadrants, no masses, hepatomegaly or splenomegaly  Neurological - normal speech, no focal findings or movement disorder noted, cranial nerves II through XII grossly intact  Extremities - peripheral pulses palpable, no pedal edema or calf pain with palpation  Skin - no gross lesions, rashes, or induration noted      DATA:  Hematology:  Recent Labs     11/29/20  0748   WBC 6.4   HGB 14.2   HCT 41.0   *   CRP 52.9*     Chemistry:  Recent Labs     11/29/20  0748 11/30/20  0451   * 136   K 3.0* 4.2   CL 90* 95*   CO2 27 30   GLUCOSE 138* 190*   BUN 21 24*   CREATININE 1.55* 1.44*   MG 1.8  --    ANIONGAP 12 11   LABGLOM 46* 50*   GFRAA 56* >60   CALCIUM 8.5* 9.3     Recent Labs     11/29/20  0748 11/29/20  1511   PROT 6.7  --    LABALBU 3.6  --    AST 82*  --    ALT 58*  --    LDH  --  370*   ALKPHOS 55  --    BILITOT 0.89  --            ASSESSMENT:  · COVID 19 PNA  · Hyponatremia  · Hypokalemia  · HTN  · HLP  · Elevated LFTs  · Past smoker  · Laryngeal CA treated    PLAN:  · Cont Decadron/Remdesivir and possibly convalescent plasma, restart home meds.     Electronically signed by Ezequiel Merino MD on 11/30/2020 at 2:03 PM

## 2020-11-30 NOTE — CONSULTS
Hypertension     Obesity        PAST SURGICAL HISTORY:  Past Surgical History:   Procedure Laterality Date    FOOT SURGERY Bilateral     bone spurs, ingrown toenails    HERNIA REPAIR      umbilical    KNEE ARTHROSCOPY Right 10/05/2020    partial menisectomy    KNEE ARTHROSCOPY Right 10/5/2020    KNEE ARTHROSCOPY RIGHT KNEE WITH PARTIAL MENISCECTOMY performed by Dory Leal MD at [address]  09/23/2014    with biopsies of vocal cords    OTHER SURGICAL HISTORY  10-29-14    direct laryngoscopy    TONSILLECTOMY            SOCIAL HISTORY:  Social History     Socioeconomic History    Marital status:      Spouse name: Not on file    Number of children: Not on file    Years of education: Not on file    Highest education level: Not on file   Occupational History    Not on file   Social Needs    Financial resource strain: Not on file    Food insecurity     Worry: Not on file     Inability: Not on file   [employer] needs     Medical: Not on file     Non-medical: Not on file   Tobacco Use    Smoking status: Former Smoker     Packs/day: 1.50     Years: 15.00     Pack years: 22.50    Smokeless tobacco: Never Used    Tobacco comment: quit 20 years ago   Substance and Sexual Activity    Alcohol use: Not Currently     Comment: socially     Drug use: No    Sexual activity: Not on file   Lifestyle    Physical activity     Days per week: Not on file     Minutes per session: Not on file    Stress: Not on file   Relationships    Social connections     Talks on phone: Not on file     Gets together: Not on file     Attends [employer] service: Not on file     Active member of club or organization: Not on file     Attends meetings of clubs or organizations: Not on file     Relationship status: Not on file    Intimate partner violence     Fear of current or ex partner: Not on file     Emotionally abused: Not on file     Physically abused: Not on file     Forced sexual activity: Not on file   Other Topics Concern    Not on file   Social History Narrative    Not on file       FAMILY HISTORY:  Family History   Problem Relation Age of Onset    Cancer Mother        REVIEW OF SYSTEMS:  All other systems reviewed and are negative. PHYSICAL EXAM:  Vital Signs Blood pressure (!) 146/86, pulse 63, temperature 98.2 °F (36.8 °C), temperature source Oral, resp. rate 16, height 5' 10\" (1.778 m), weight 285 lb (129.3 kg), SpO2 94 %. Oxygen Amount and Delivery: O2 Flow Rate (L/min): 3 L/min    Admission Weight Weight: 285 lb (129.3 kg)    General Appearance awake and alert, looks comfortable no distress    Head  Normocephalic, without obvious abnormality, atraumatic    Eyes  conjunctivae/corneas clear. PERRL, EOM's intact.   Neck  no adenopathy, no JVD,   Lungs decreased sounds, coarse, no wheezing or distress  Heart: regular rate and rhythm, S1, S2 normal, no murmur, click, rub or gallop  Abdomen  soft, non-tender; bowel sounds normal; no guarding  Extremities mild edema, no calf tenderness or stiffness  Skin  Skin color, texture, turgor normal. No rashes or lesions  Neurologic: Alert and awake, appropriate        Imaging  As above    Lab Review  CBC     Lab Results   Component Value Date    WBC 6.4 11/29/2020    RBC 4.29 11/29/2020    HGB 14.2 11/29/2020    HCT 41.0 11/29/2020     11/29/2020    MCV 95.5 11/29/2020    MCH 33.0 11/29/2020    MCHC 34.5 11/29/2020    RDW 14.7 11/29/2020    LYMPHOPCT 13 11/29/2020    MONOPCT 11 11/29/2020    BASOPCT 2 11/29/2020    MONOSABS 0.70 11/29/2020    LYMPHSABS 0.80 11/29/2020    EOSABS 0.00 11/29/2020    BASOSABS 0.10 11/29/2020    DIFFTYPE NOT REPORTED 11/29/2020       BMP   Lab Results   Component Value Date     11/30/2020    K 4.2 11/30/2020    CL 95 11/30/2020    CO2 30 11/30/2020    BUN 24 11/30/2020    CREATININE 1.44 11/30/2020    GLUCOSE 190 11/30/2020    CALCIUM 9.3 11/30/2020       LFTS  Lab Results   Component Value Date    ALKPHOS 55 11/29/2020    ALT 58 11/29/2020    AST 82 11/29/2020    PROT 6.7 11/29/2020    BILITOT 0.89 11/29/2020    LABALBU 3.6 11/29/2020       INR  No results for input(s): PROTIME, INR in the last 72 hours. APTT  No results for input(s): APTT in the last 72 hours. Lactic Acid  No results found for: LACTA     PRO-BNP   No results for input(s): PROBNP in the last 72 hours.         ABGs: No results found for: PHART, PO2ART, GQO1IPK    No results found for: IFIO2, MODE, SETTIDVOL, SETPEEP      Impression  Hypoxia   patchy infiltrates left base with the COVID-19 pneumonia, procalcitonin at 0.26  History of tobacco abuse, denies underlying lung disease or sleep apnea  Morbid obesity BMI 40.89  Elevated inflammatory markers with CRP, LDH and ferritin  Acute kidney injury  HTN, HLD  History of left vocal cord cancer status post radiation    Plan:    O2 as needed, home O2 eval upon discharge  On bronchodilators  Dexamethasone and remdesivir, ID following  On Lovenox, check D-dimer  Discussed with staff  Thanks for consultation we will follow

## 2020-11-30 NOTE — PLAN OF CARE
Problem: Gas Exchange - Impaired  Goal: Absence of hypoxia  11/30/2020 0555 by Curtis Escobar  Outcome: Ongoing   Pt continues to have oxygen saturations in the 90's on NC      Problem:  Body Temperature -  Risk of, Imbalanced  Goal: Ability to maintain a body temperature within defined limits  11/30/2020 0555 by Curtis Escobar  Outcome: Ongoing       Problem: Fatigue  Goal: Verbalize increase energy and improved vitality  11/30/2020 0555 by Curtis Escobar  Outcome: Ongoing       Problem: Risk for Fluid Volume Deficit  Goal: Maintain absence of muscle cramping  11/30/2020 0555 by Curtis Escobar  Outcome: Ongoing

## 2020-11-30 NOTE — CARE COORDINATION
CASE MANAGEMENT NOTE:    Admission Date:  11/29/2020 Elmira Mae is a 60 y.o.  male    Admitted for : COVID-19 [U07.1]   Afebrile, on 3L per NC. Spoke with patient over the phone. PCP:  Dr. Vandana Khan:  [facility]      Current Residence/ Living Arrangements:  independently at home with spouse and drives. Current Services PTA:  No    Is patient agreeable to VNS: No    Freedom of choice provided:  Yes    List of [address] provided: No    VNS chosen:  NA    DME:  none    Home Oxygen: No    Nebulizer: No    CPAP/BIPAP: No    Supplier: N/A    Potential Assistance Needed: Follow for home 02. SNF needed: No    Freedom of choice and list provided: NA    Pharmacy:  [facility] Badillo in [address] [place] [address]       Does Patient want to use MEDS to BEDS? No    Is patient currently receiving oral anticoagulation therapy? No    Is the Patient an [facility] with Readmission Risk Score greater than 14%? No  If yes, pt needs a follow up appointment made within 7 days. Family Members/Caregivers that pt would like involved in their care:    Yes    If yes, list name here:  Heike Rios    Transportation Provider:  Patient and Family             Discharge Plan:  Home without needs.                  Electronically signed by: Seamus Raygoza RN on 11/30/2020 at 3:28 PM

## 2020-12-01 ENCOUNTER — APPOINTMENT (OUTPATIENT)
Dept: GENERAL RADIOLOGY | Age: 60
DRG: 177 | End: 2020-12-01
Payer: COMMERCIAL

## 2020-12-01 PROBLEM — R09.02 HYPOXIA: Status: ACTIVE | Noted: 2020-12-01

## 2020-12-01 PROBLEM — J12.82 PNEUMONIA DUE TO COVID-19 VIRUS: Status: ACTIVE | Noted: 2020-12-01

## 2020-12-01 PROBLEM — U07.1 PNEUMONIA DUE TO COVID-19 VIRUS: Status: ACTIVE | Noted: 2020-12-01

## 2020-12-01 PROCEDURE — 6360000002 HC RX W HCPCS: Performed by: STUDENT IN AN ORGANIZED HEALTH CARE EDUCATION/TRAINING PROGRAM

## 2020-12-01 PROCEDURE — 2500000003 HC RX 250 WO HCPCS: Performed by: INTERNAL MEDICINE

## 2020-12-01 PROCEDURE — 99232 SBSQ HOSP IP/OBS MODERATE 35: CPT | Performed by: INTERNAL MEDICINE

## 2020-12-01 PROCEDURE — 71045 X-RAY EXAM CHEST 1 VIEW: CPT

## 2020-12-01 PROCEDURE — 94664 DEMO&/EVAL PT USE INHALER: CPT

## 2020-12-01 PROCEDURE — 2580000003 HC RX 258: Performed by: FAMILY MEDICINE

## 2020-12-01 PROCEDURE — 2580000003 HC RX 258: Performed by: INTERNAL MEDICINE

## 2020-12-01 PROCEDURE — 6370000000 HC RX 637 (ALT 250 FOR IP): Performed by: FAMILY MEDICINE

## 2020-12-01 PROCEDURE — 6360000002 HC RX W HCPCS: Performed by: INTERNAL MEDICINE

## 2020-12-01 PROCEDURE — 2060000000 HC ICU INTERMEDIATE R&B

## 2020-12-01 RX ADMIN — REMDESIVIR 100 MG: 100 INJECTION, POWDER, LYOPHILIZED, FOR SOLUTION INTRAVENOUS at 15:37

## 2020-12-01 RX ADMIN — ENOXAPARIN SODIUM 30 MG: 30 INJECTION SUBCUTANEOUS at 20:15

## 2020-12-01 RX ADMIN — ENOXAPARIN SODIUM 30 MG: 30 INJECTION SUBCUTANEOUS at 07:22

## 2020-12-01 RX ADMIN — Medication 10 ML: at 20:15

## 2020-12-01 RX ADMIN — HYDROCHLOROTHIAZIDE 25 MG: 25 TABLET ORAL at 07:23

## 2020-12-01 RX ADMIN — DEXAMETHASONE SODIUM PHOSPHATE 6 MG: 4 INJECTION, SOLUTION INTRAMUSCULAR; INTRAVENOUS at 07:23

## 2020-12-01 RX ADMIN — ATENOLOL 100 MG: 50 TABLET ORAL at 07:23

## 2020-12-01 RX ADMIN — BUDESONIDE AND FORMOTEROL FUMARATE DIHYDRATE 2 PUFF: 160; 4.5 AEROSOL RESPIRATORY (INHALATION) at 07:29

## 2020-12-01 RX ADMIN — ATORVASTATIN CALCIUM 40 MG: 40 TABLET, FILM COATED ORAL at 07:23

## 2020-12-01 RX ADMIN — ALLOPURINOL 300 MG: 100 TABLET ORAL at 07:23

## 2020-12-01 RX ADMIN — ASPIRIN 325 MG: 325 TABLET, COATED ORAL at 07:23

## 2020-12-01 RX ADMIN — BUDESONIDE AND FORMOTEROL FUMARATE DIHYDRATE 2 PUFF: 160; 4.5 AEROSOL RESPIRATORY (INHALATION) at 20:15

## 2020-12-01 RX ADMIN — Medication 10 ML: at 07:28

## 2020-12-01 NOTE — PROGRESS NOTES
Pulmonary Progress Note  Pulmonary and Critical Care Specialists      Patient - Julio Kiser,  Age - 60 y.o.    - 1960      Room Number - /-31   N -  [id number]   [facility]  Date of Admission -  2020  7:22 AM        Consulting Aaron Meléndez MD  Primary Care Physician - Yaima Gavin MD     SUBJECTIVE   Discussed with staff, she is doing fine on 2 L O2  No fever or chills, T-max 98  No nausea vomiting or diarrhea  Some short of breath with activity, no cough    OBJECTIVE   VITALS    height is 5' 10\" (1.778 m) and weight is 258 lb 13.1 oz (117.4 kg). His oral temperature is 98 °F (36.7 °C). His blood pressure is 149/82 (abnormal) and his pulse is 64. His respiration is 20 and oxygen saturation is 90%. Body mass index is 37.14 kg/m².   Temperature Range: Temp: 98 °F (36.7 °C) Temp  Av.7 °F (36.5 °C)  Min: 97.5 °F (36.4 °C)  Max: 98 °F (36.7 °C)  BP Range:  Systolic (59YZT), YXD:661 , Min:123 , GCE:615     Diastolic (62XAQ), XPC:69, Min:72, Max:82    Pulse Range: Pulse  Av.2  Min: 52  Max: 64  Respiration Range: Resp  Av.6  Min: 16  Max: 20  Current Pulse Ox[de-identified]  SpO2: 90 %  24HR Pulse Ox Range:  SpO2  Av.6 %  Min: 90 %  Max: 93 %  Oxygen Amount and Delivery: O2 Flow Rate (L/min): 2 L/min    Wt Readings from Last 3 Encounters:   20 258 lb 13.1 oz (117.4 kg)   10/05/20 278 lb 9.6 oz (126.4 kg)   20 290 lb (131.5 kg)       I/O (24 Hours)    Intake/Output Summary (Last 24 hours) at 2020 1503  Last data filed at 2020 0556  Gross per 24 hour   Intake 730 ml   Output --   Net 730 ml       EXAM     Not done to limit exposure and preserve PPE      MEDS      sodium chloride flush  10 mL Intravenous 2 times per day    enoxaparin  30 mg Subcutaneous BID    remdesivir IVPB  100 mg Intravenous Q24H    dexamethasone  6 mg Intravenous Daily    allopurinol  300 mg Oral Daily    aspirin  325 mg Oral Daily    atorvastatin  40 mg Oral Daily    atenolol  100 mg Oral Daily    And    hydroCHLOROthiazide  25 mg Oral Daily    budesonide-formoterol  2 puff Inhalation BID       sodium chloride flush, acetaminophen, sodium chloride    LABS   CBC   Recent Labs     11/29/20  0748   WBC 6.4   HGB 14.2   HCT 41.0   MCV 95.5   *     BMP:   Lab Results   Component Value Date     11/30/2020    K 4.2 11/30/2020    CL 95 11/30/2020    CO2 30 11/30/2020    BUN 24 11/30/2020    LABALBU 3.6 11/29/2020    CREATININE 1.44 11/30/2020    CALCIUM 9.3 11/30/2020    GFRAA >60 11/30/2020    LABGLOM 50 11/30/2020     ABGs:No results found for: PHART, PO2ART, ODT5CAY No results found for: IFIO2, MODE, SETTIDVOL, SETPEEP  Ionized Calcium:  No results found for: IONCA  Magnesium:    Lab Results   Component Value Date    MG 1.8 11/29/2020      Phosphorus:  No results found for: PHOS     LIVER PROFILE   Recent Labs     11/29/20  0748   AST 82*   ALT 58*   BILITOT 0.89   ALKPHOS 55     INR No results for input(s): INR in the last 72 hours. PTT No results for input(s): APTT in the last 72 hours. BNP No results for input(s): BNP in the last 72 hours.     RADIOLOGY     (See actual reports for details)    ASSESSMENT/PLAN   Principal Problem:    Pneumonia due to COVID-19 virus  Active Problems:    Hypoxia  patchy infiltrates left base with the COVID-19 pneumonia, procalcitonin at 0.26  History of tobacco abuse, denies underlying lung disease or sleep apnea  Morbid obesity BMI 40.89  Elevated inflammatory markers with CRP, LDH and ferritin  Acute kidney injury  HTN, HLD  History of left vocal cord cancer status post radiation     Plan:    O2 , home O2 eval upon discharge  On bronchodilators  Dexamethasone and remdesivir, ID following  On Lovenox for DVT prophylaxis, D-dimer at 0.61  Discussed with staff      Electronically signed by Satish Palumbo MD on 12/1/2020 at 3:03 PM

## 2020-12-01 NOTE — DISCHARGE INSTR - COC
Continuity of Care Form    Patient Name: Roland Fleming   :  1960  MRN:  [id number]    Admit date:  2020  Discharge date:      Code Status Order: Full Code   Advance Directives:   [address] Directive Type of Healthcare Directive Copy in [address] Agent's Name Healthcare Agent's Phone Number    [phone]  Yes, patient has an advance directive for healthcare treatment  Living will  No, copy requested from family  Spouse  Amy  --          Admitting Physician:  Dany Ormond, MD  PCP: Paris Casanova MD    Discharging Nurse: [facility]-ER Unit/Room#: /-83  Discharging Unit Phone Number: [phone]    Emergency Contact:   Extended Emergency Contact Information  Primary Emergency Contact: Luh Benitez   *HIPAA*  Address: Lazarus Turpin [address] [place] [address].  Home Phone: [phone]  Work Phone: [phone]  Mobile Phone: [phone]  Relation: Spouse    Past Surgical History:  Past Surgical History:   Procedure Laterality Date    FOOT SURGERY Bilateral     bone spurs, ingrown toenails    HERNIA REPAIR      umbilical    KNEE ARTHROSCOPY Right 10/05/2020    partial menisectomy    KNEE ARTHROSCOPY Right 10/5/2020    KNEE ARTHROSCOPY RIGHT KNEE WITH PARTIAL MENISCECTOMY performed by Traci Marquis MD at [address]  2014    with biopsies of vocal cords    OTHER SURGICAL HISTORY  10-29-14    direct laryngoscopy    TONSILLECTOMY         Immunization History:   Immunization History   Administered Date(s) Administered    Influenza Virus Vaccine 10/09/2014       Active Problems:  Patient Active Problem List   Diagnosis Code    Vocal cord mass J38.3    Laryngeal cancer ([facility] [address] C32.9    Acute medial meniscus tear of right knee S83.241A    COVID-19 U07.1    Pneumonia due to COVID-19 virus U07.1, J12.89    Hypoxia R09.02 Isolation/Infection:   Isolation          Droplet Plus        Patient Infection Status     Infection Onset Added Last Indicated Last Indicated By Review Planned Expiration Resolved Resolved By    COVID-19 11/29/20 11/29/20 11/29/20 COVID-19 12/09/20 12/13/20      Resolved    COVID-19 Rule Out 11/29/20 11/29/20 11/29/20 COVID-19 (Ordered)   11/29/20 Rule-Out Test Resulted    COVID-19 Rule Out 09/30/20 09/30/20 10/01/20 Covid-19 Ambulatory (Ordered)   10/02/20 Rule-Out Test Resulted          Nurse Assessment:  Last Vital Signs: BP (!) 145/72   Pulse 62   Temp 97.5 °F (36.4 °C) (Oral)   Resp 20   Ht 5' 10\" (1.778 m)   Wt 258 lb 13.1 oz (117.4 kg)   SpO2 93%   BMI 37.14 kg/m²     Last documented pain score (0-10 scale): Pain Level: 0  Last Weight:   Wt Readings from Last 1 Encounters:   12/01/20 258 lb 13.1 oz (117.4 kg)     Mental Status:  oriented and alert    IV Access:  - None    Nursing Mobility/ADLs:  Walking   Independent  Transfer  Independent  Bathing  Independent  Dressing  Independent  Toileting  Independent  Feeding  [address]  Independent  Med Delivery   whole    Wound Care Documentation and Therapy:        Elimination:  Continence:   · Bowel: Yes  · Bladder: Yes  Urinary Catheter: None   Colostomy/Ileostomy/Ileal Conduit: No           Intake/Output Summary (Last 24 hours) at 12/1/2020 1102  Last data filed at 12/1/2020 0556  Gross per 24 hour   Intake 730 ml   Output --   Net 730 ml     I/O last 3 completed shifts: In: 36 [P.O.:480; IV Piggyback:250]  Out: -     Safety Concerns:     None    Impairments/Disabilities:      None    Nutrition Therapy:  Current Nutrition Therapy:   - Oral Diet:  General    Routes of Feeding: Oral  Liquids:  Thin Liquids  Daily Fluid Restriction: no  Last Modified Barium Swallow with Video (Video Swallowing Test): not done    Treatments at the Time of Hospital Discharge:   Respiratory Treatments: as needed  Oxygen Therapy:  Patient is on 3L of oxygen

## 2020-12-01 NOTE — CARE COORDINATION
ONGOING DISCHARGE PLAN:    COVID +. Remains Afebrile, 93[facility] [address] regarding discharge plan and patient confirms that plan is still to return to home w/ Wife, w/ no need for VNS at this time. Pt. Remains on IV Remdesivir & IV Decadron. CXR today. Continue to follow for Home Oxygen. ID/Pulmonary on board. Will continue to follow for additional discharge needs.     Electronically signed by Amanda Brennan RN on 12/1/2020 at 11:19 AM

## 2020-12-01 NOTE — PLAN OF CARE
Problem: Airway Clearance - Ineffective  Goal: Achieve or maintain patent airway  Outcome: Met This Shift     Problem: Breathing Pattern - Ineffective  Goal: Ability to achieve and maintain a regular respiratory rate  Outcome: Met This Shift     Problem: Body Temperature -  Risk of, Imbalanced  Goal: Ability to maintain a body temperature within defined limits  Outcome: Met This Shift  Goal: Will regain or maintain usual level of consciousness  Outcome: Met This Shift  Goal: Complications related to the disease process, condition or treatment will be avoided or minimized  Outcome: Met This Shift     Problem: Isolation Precautions - Risk of Spread of Infection  Goal: Prevent transmission of infection  Outcome: Met This Shift     Problem: Risk for Fluid Volume Deficit  Goal: Maintain normal heart rhythm  Outcome: Met This Shift  Goal: Maintain absence of muscle cramping  Outcome: Met This Shift     Problem: Loneliness or Risk for Loneliness  Goal: Demonstrate positive use of time alone when socialization is not possible  Outcome: Met This Shift     Problem: Patient Education: Go to Patient Education Activity  Goal: Patient/Family Education  Outcome: Met This Shift     Problem: Gas Exchange - Impaired  Goal: Absence of hypoxia  Outcome: Ongoing  Goal: Promote optimal lung function  Outcome: Ongoing  Note:  Encourage frequent repositioning and lying prone position if tolerated.       Problem: Nutrition Deficits  Goal: Optimize nutrtional status  Outcome: Ongoing     Problem: Risk for Fluid Volume Deficit  Goal: Maintain normal serum potassium, sodium, calcium, phosphorus, and pH  Outcome: Ongoing     Problem: Fatigue  Goal: Verbalize increase energy and improved vitality  Outcome: Ongoing

## 2020-12-01 NOTE — FLOWSHEET NOTE
Patient was in good spirits, noting he's \"100%\" better than when he came in and he's ready to go home to fully recover. 11/30/20 1935   Encounter Summary   Services provided to: Patient   Referral/Consult From: Rounding  (by phone)   Support System Spouse   Continue Visiting   (11-30-20)   Complexity of Encounter Low   Length of Encounter 15 minutes   Routine   Type Initial   Spiritual/Shinto   Type Spiritual support   Assessment Calm; Approachable   Intervention Active listening;Explored feelings, thoughts, concerns;Sustaining presence/ Ministry of presence; Discussed illness/injury and it's impact   Outcome Expressed gratitude;Engaged in conversation;Expressed feelings/needs/concerns;Coping

## 2020-12-01 NOTE — PROGRESS NOTES
Progress Note    12/1/2020 10:15 AM  Subjective: Interval History: Systemic sxs are pretty much gone;SOB with exertion-if off of O2 desats into low 80's;sense of smell still gone but appetite is good;no lower GI sxs    Diet: DIET GENERAL;    Medications:   Reviewed medications    Labs:   CBC:   Recent Labs     11/29/20  0748   WBC 6.4   HGB 14.2   *     BMP:    Recent Labs     11/30/20  0451      K 4.2   CL 95*   CO2 30   BUN 24*   CREATININE 1.44*   GLUCOSE 190*     Hepatic:   Recent Labs     11/29/20  0748   AST 82*   ALT 58*   BILITOT 0.89   ALKPHOS 55     Troponin: No results for input(s): TROPONINI in the last 72 hours. BNP: No results for input(s): BNP in the last 72 hours. Lipids: No results for input(s): CHOL, HDL in the last 72 hours. Invalid input(s): LDLCALCU  INR: No results for input(s): INR in the last 72 hours. Objective:   Vitals: BP (!) 145/72   Pulse 62   Temp 97.5 °F (36.4 °C) (Oral)   Resp 20   Ht 5' 10\" (1.778 m)   Wt 258 lb 13.1 oz (117.4 kg)   SpO2 93%   BMI 37.14 kg/m²   General appearance: alert and cooperative with exam  Neck: no adenopathy, no carotid bruit, no JVD, supple, symmetrical, trachea midline and thyroid not enlarged, symmetric, no tenderness/mass/nodules  Lungs: BS diminished bilaterally  Heart: regular rate and rhythm, S1, S2 normal, no murmur, click, rub or gallop  Abdomen: soft, non-tender; bowel sounds normal; no masses,  no organomegaly  Extremities: extremities normal, atraumatic, no cyanosis or edema  Neurologic: Mental status: Alert, oriented, thought content appropriate    Assessment:     Patient Active Problem List    Diagnosis Date Noted    COVID-19 11/29/2020    Acute medial meniscus tear of right knee     Laryngeal cancer ([facility] [address] 09/30/2014    Vocal cord mass 09/23/2014     COVID pneumonia     Plan:   1. Repeat 1 view chest x ray  2.  Regular diet    Electronically signed by Jayro Kauffman MD on 12/1/2020 at 10:15 AM

## 2020-12-01 NOTE — PROGRESS NOTES
Home Oxygen Evaluation completed.     Patient is resting on 4 liters per minute via nasal cannula  Resting SpO2 = 92%  Resting SpO2 on room air = 82%    SpO2 on  5L with exercise = 89%      Aarti Van  3:25 PM

## 2020-12-02 VITALS
HEART RATE: 67 BPM | SYSTOLIC BLOOD PRESSURE: 136 MMHG | DIASTOLIC BLOOD PRESSURE: 73 MMHG | TEMPERATURE: 97.3 F | RESPIRATION RATE: 18 BRPM | BODY MASS INDEX: 37.05 KG/M2 | WEIGHT: 258.82 LBS | OXYGEN SATURATION: 90 % | HEIGHT: 70 IN

## 2020-12-02 LAB
ABSOLUTE EOS #: 0 K/UL (ref 0–0.4)
ABSOLUTE IMMATURE GRANULOCYTE: ABNORMAL K/UL (ref 0–0.3)
ABSOLUTE LYMPH #: 0.79 K/UL (ref 1–4.8)
ABSOLUTE MONO #: 0.79 K/UL (ref 0.1–1.3)
ALBUMIN SERPL-MCNC: 3.6 G/DL (ref 3.5–5.2)
ALBUMIN/GLOBULIN RATIO: ABNORMAL (ref 1–2.5)
ALP BLD-CCNC: 56 U/L (ref 40–129)
ALT SERPL-CCNC: 95 U/L (ref 5–41)
ANION GAP SERPL CALCULATED.3IONS-SCNC: 12 MMOL/L (ref 9–17)
AST SERPL-CCNC: 65 U/L
BASOPHILS # BLD: 0 % (ref 0–2)
BASOPHILS ABSOLUTE: 0 K/UL (ref 0–0.2)
BILIRUB SERPL-MCNC: 0.78 MG/DL (ref 0.3–1.2)
BUN BLDV-MCNC: 31 MG/DL (ref 8–23)
BUN/CREAT BLD: ABNORMAL (ref 9–20)
C-REACTIVE PROTEIN: 22.3 MG/L (ref 0–5)
CALCIUM SERPL-MCNC: 9.5 MG/DL (ref 8.6–10.4)
CHLORIDE BLD-SCNC: 99 MMOL/L (ref 98–107)
CO2: 29 MMOL/L (ref 20–31)
CREAT SERPL-MCNC: 1.42 MG/DL (ref 0.7–1.2)
DIFFERENTIAL TYPE: ABNORMAL
EOSINOPHILS RELATIVE PERCENT: 0 % (ref 0–4)
FERRITIN: 2188 UG/L (ref 30–400)
GFR AFRICAN AMERICAN: >60 ML/MIN
GFR NON-AFRICAN AMERICAN: 51 ML/MIN
GFR SERPL CREATININE-BSD FRML MDRD: ABNORMAL ML/MIN/{1.73_M2}
GFR SERPL CREATININE-BSD FRML MDRD: ABNORMAL ML/MIN/{1.73_M2}
GLUCOSE BLD-MCNC: 171 MG/DL (ref 70–99)
HCT VFR BLD CALC: 41.9 % (ref 41–53)
HEMOGLOBIN: 14.7 G/DL (ref 13.5–17.5)
IMMATURE GRANULOCYTES: ABNORMAL %
LACTATE DEHYDROGENASE: 377 U/L (ref 135–225)
LYMPHOCYTES # BLD: 8 % (ref 24–44)
MCH RBC QN AUTO: 33.1 PG (ref 26–34)
MCHC RBC AUTO-ENTMCNC: 35.2 G/DL (ref 31–37)
MCV RBC AUTO: 94.2 FL (ref 80–100)
MONOCYTES # BLD: 8 % (ref 1–7)
MORPHOLOGY: NORMAL
NRBC AUTOMATED: ABNORMAL PER 100 WBC
PDW BLD-RTO: 14.5 % (ref 11.5–14.9)
PLATELET # BLD: 253 K/UL (ref 150–450)
PLATELET ESTIMATE: ABNORMAL
PMV BLD AUTO: 8.3 FL (ref 6–12)
POTASSIUM SERPL-SCNC: 3.8 MMOL/L (ref 3.7–5.3)
RBC # BLD: 4.45 M/UL (ref 4.5–5.9)
RBC # BLD: ABNORMAL 10*6/UL
SEG NEUTROPHILS: 84 % (ref 36–66)
SEGMENTED NEUTROPHILS ABSOLUTE COUNT: 8.32 K/UL (ref 1.3–9.1)
SODIUM BLD-SCNC: 140 MMOL/L (ref 135–144)
TOTAL PROTEIN: 6.2 G/DL (ref 6.4–8.3)
WBC # BLD: 9.9 K/UL (ref 3.5–11)
WBC # BLD: ABNORMAL 10*3/UL

## 2020-12-02 PROCEDURE — 80053 COMPREHEN METABOLIC PANEL: CPT

## 2020-12-02 PROCEDURE — 36415 COLL VENOUS BLD VENIPUNCTURE: CPT

## 2020-12-02 PROCEDURE — 86140 C-REACTIVE PROTEIN: CPT

## 2020-12-02 PROCEDURE — 2580000003 HC RX 258: Performed by: INTERNAL MEDICINE

## 2020-12-02 PROCEDURE — 6370000000 HC RX 637 (ALT 250 FOR IP): Performed by: FAMILY MEDICINE

## 2020-12-02 PROCEDURE — 83615 LACTATE (LD) (LDH) ENZYME: CPT

## 2020-12-02 PROCEDURE — 82728 ASSAY OF FERRITIN: CPT

## 2020-12-02 PROCEDURE — 85025 COMPLETE CBC W/AUTO DIFF WBC: CPT

## 2020-12-02 PROCEDURE — 6360000002 HC RX W HCPCS: Performed by: INTERNAL MEDICINE

## 2020-12-02 PROCEDURE — 94761 N-INVAS EAR/PLS OXIMETRY MLT: CPT

## 2020-12-02 PROCEDURE — 6360000002 HC RX W HCPCS: Performed by: STUDENT IN AN ORGANIZED HEALTH CARE EDUCATION/TRAINING PROGRAM

## 2020-12-02 PROCEDURE — 2580000003 HC RX 258: Performed by: FAMILY MEDICINE

## 2020-12-02 PROCEDURE — 99232 SBSQ HOSP IP/OBS MODERATE 35: CPT | Performed by: INTERNAL MEDICINE

## 2020-12-02 PROCEDURE — 2500000003 HC RX 250 WO HCPCS: Performed by: INTERNAL MEDICINE

## 2020-12-02 RX ORDER — DEXAMETHASONE 0.5 MG/1
2 TABLET ORAL EVERY 8 HOURS SCHEDULED
Qty: 48 TABLET | Refills: 0 | Status: SHIPPED | OUTPATIENT
Start: 2020-12-02 | End: 2020-12-06

## 2020-12-02 RX ORDER — BUDESONIDE AND FORMOTEROL FUMARATE DIHYDRATE 160; 4.5 UG/1; UG/1
2 AEROSOL RESPIRATORY (INHALATION) 2 TIMES DAILY
Qty: 1 INHALER | Refills: 3 | Status: SHIPPED | OUTPATIENT
Start: 2020-12-02

## 2020-12-02 RX ADMIN — ATENOLOL 100 MG: 50 TABLET ORAL at 07:55

## 2020-12-02 RX ADMIN — BUDESONIDE AND FORMOTEROL FUMARATE DIHYDRATE 2 PUFF: 160; 4.5 AEROSOL RESPIRATORY (INHALATION) at 07:55

## 2020-12-02 RX ADMIN — HYDROCHLOROTHIAZIDE 25 MG: 25 TABLET ORAL at 07:55

## 2020-12-02 RX ADMIN — ENOXAPARIN SODIUM 30 MG: 30 INJECTION SUBCUTANEOUS at 07:55

## 2020-12-02 RX ADMIN — ALLOPURINOL 300 MG: 100 TABLET ORAL at 07:55

## 2020-12-02 RX ADMIN — REMDESIVIR 100 MG: 100 INJECTION, POWDER, LYOPHILIZED, FOR SOLUTION INTRAVENOUS at 15:12

## 2020-12-02 RX ADMIN — DEXAMETHASONE SODIUM PHOSPHATE 6 MG: 4 INJECTION, SOLUTION INTRAMUSCULAR; INTRAVENOUS at 07:56

## 2020-12-02 RX ADMIN — ASPIRIN 325 MG: 325 TABLET, COATED ORAL at 07:55

## 2020-12-02 RX ADMIN — Medication 10 ML: at 07:55

## 2020-12-02 RX ADMIN — ATORVASTATIN CALCIUM 40 MG: 40 TABLET, FILM COATED ORAL at 07:55

## 2020-12-02 ASSESSMENT — PAIN SCALES - GENERAL: PAINLEVEL_OUTOF10: 0

## 2020-12-02 NOTE — PROGRESS NOTES
Dr. Shaun Torres rounding    Dr. Shaun Torres stated if Infectious and Pulmonary are ok with patient D/C-he will discharge patient.  Awaiting new Lab orders and Pulmonary

## 2020-12-02 NOTE — PROGRESS NOTES
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 At the ER rapid COVID-19 test was positive, was hypoxic with reported O2 sat of 89% on room air was placed on 4 L of oxygen per nasal cannula initially. Chest x-ray showed patchy infiltrates of the left base    Interval changes  12/1/2020   The patient still on 2L of oxygen per nasal cannula, no reported fever, no reported vomiting or diarrhea, no acute events    Patient Vitals for the past 8 hrs:   BP Temp Temp src Pulse Resp SpO2   12/01/20 2000 (!) 124/96 97.9 °F (36.6 °C) Oral 57 18 92 %   12/01/20 1600 -- -- -- -- -- (!) 89 %   12/01/20 1400 (!) 149/82 98 °F (36.7 °C) Oral 64 20 90 %             I have personally reviewed the past medical history, past surgical history, medications, social history, and family history, and I haveupdated the database accordingly. Allergies:   Ceftin [cefuroxime axetil];  Doxycycline monohydrate; Pcn [penicillins]; and Sulfa antibiotics     Review of Systems:     Review of Systems  As per history of present illness, other 12 system review was negative  Physical Examination :       Physical Exam  Due to COVID-19 pandemic my exam was deferred  Past Medical History:     Past Medical History:   Diagnosis Date    Arthritis     gout    Cancer Minutes per session: Not on file    Stress: Not on file   Relationships    Social connections     Talks on phone: Not on file     Gets together: Not on file     Attends [employer] service: Not on file     Active member of club or organization: Not on file     Attends meetings of clubs or organizations: Not on file     Relationship status: Not on file    Intimate partner violence     Fear of current or ex partner: Not on file     Emotionally abused: Not on file     Physically abused: Not on file     Forced sexual activity: Not on file   Other Topics Concern    Not on file   Social History Narrative    Not on file       Family History:     Family History   Problem Relation Age of Onset    Cancer Mother       Medical Decision Making:   I have independently reviewed/ordered the following labs:    CBC with Differential:   Recent Labs     11/29/20  0748   WBC 6.4   HGB 14.2   HCT 41.0   *   LYMPHOPCT 13*   MONOPCT 11*     BMP:  Recent Labs     11/29/20  0748 11/30/20  0451   * 136   K 3.0* 4.2   CL 90* 95*   CO2 27 30   BUN 21 24*   CREATININE 1.55* 1.44*   MG 1.8  --      Hepatic Function Panel:   Recent Labs     11/29/20  0748   PROT 6.7   LABALBU 3.6   BILITOT 0.89   ALKPHOS 55   ALT 58*   AST 82*     No results for input(s): RPR in the last 72 hours. No results for input(s): HIV in the last 72 hours. No results for input(s): BC in the last 72 hours. Lab Results   Component Value Date    CREATININE 1.44 11/30/2020    GLUCOSE 190 11/30/2020       Detailed results: Thank you for allowing us to participate in the care of this patient. Please call with questions. This note is created with the assistance of a speech recognition program.  While intending to generate adocument that actually reflects the content of the visit, the document can still have some errors including those of syntax and sound a like substitutions which may escape proof reading.   It such instances, actual meaningcan be extrapolated by contextual diversion.     Michael Robb MD  Office: [phone]  Perfect serve / office [phone]

## 2020-12-02 NOTE — PROGRESS NOTES
Discharge from Pulmonary     Dr. Ignacio Dias rounded, spoke with patient regarding O2, stated he was good with patient discharging from Pulmonary side.

## 2020-12-02 NOTE — PROGRESS NOTES
[facility]  Progress Note    12/2/2020 1:56 PM  Subjective: Interval History: Pt feels much better, has no n/v/d, eating well, no SOB on O2. Pt understands that he will need Home O2 and feels comfortable using it. Diet: DIET GENERAL;    Medications:   Reviewed medications    Labs:   CBC:   Recent Labs     12/02/20  1101   WBC 9.9   HGB 14.7        BMP:    Recent Labs     12/02/20  1101      K 3.8   CL 99   CO2 29   BUN 31*   CREATININE 1.42*   GLUCOSE 171*     Hepatic:   Recent Labs     12/02/20  1101   AST 65*   ALT 95*   BILITOT 0.78   ALKPHOS 56     Troponin: No results for input(s): TROPONINI in the last 72 hours. BNP: No results for input(s): BNP in the last 72 hours. Lipids: No results for input(s): CHOL, HDL in the last 72 hours. Invalid input(s): LDLCALCU  INR: No results for input(s): INR in the last 72 hours. Objective:   Vitals: /73   Pulse 67   Temp 97.3 °F (36.3 °C) (Oral)   Resp 18   Ht 5' 10\" (1.778 m)   Wt 258 lb 13.1 oz (117.4 kg)   SpO2 92%   BMI 37.14 kg/m²   General appearance: alert and cooperative with exam  Neck: no adenopathy, no carotid bruit, no JVD, supple, symmetrical, trachea midline and thyroid not enlarged, symmetric, no tenderness/mass/nodules  Lungs: clear to auscultation bilaterally  Heart: regular rate and rhythm, S1, S2 normal, no murmur, click, rub or gallop  Abdomen: soft, non-tender; bowel sounds normal; no masses,  no organomegaly  Extremities: extremities normal, atraumatic, no cyanosis or edema  Neurologic: Mental status: Alert, oriented, thought content appropriate    Assessment:     Patient Active Problem List    Diagnosis Date Noted    Pneumonia due to COVID-19 virus 12/01/2020    Hypoxia 12/01/2020    COVID-19 11/29/2020    Acute medial meniscus tear of right knee     Laryngeal cancer ([address] 09/30/2014    Vocal cord mass 09/23/2014        Plan:   1.  Home with O2 if OK with Pulm and ID    Electronically signed by Shakira Hays

## 2020-12-02 NOTE — DISCHARGE SUMMARY
[facility]   Discharge Summary    Patient ID: Jayson Acuna  MRN: [id number]     Acct:  [de-identified]       Patient's PCP: Bryce Longoria MD    Admit Date: 11/29/2020     Discharge Date:  12/2/20     Admitting Physician: Letha Haney MD    Discharge Physician: Bryce Longoria MD     Active Discharge Diagnoses:  Hypoxia  Primary Problem  Pneumonia due to [facility] Problems    Diagnosis Date Noted    Pneumonia due to COVID-19 virus [U07.1, J12.89] 12/01/2020    Hypoxia [R09.02] 12/01/2020    COVID-19 [U07.1] 11/29/2020       The patient was seen and examined on day of discharge and this discharge summary is in conjunction with any daily progress note from day of discharge. Code Status:  Full Code    Hospital Course: Admitted with COVID 19 PNA, was seen by Pulm and ID, pt was treated with Decadron,O2 and Remdesivir with improvement, still requiring )2 2.5 L to keep SaO2 above 90. Consults:  IP CONSULT TO INTERNAL MEDICINE  IP CONSULT TO INFECTIOUS DISEASES  IP CONSULT TO PULMONOLOGY    Disposition: Home    Discharged Condition: Stable    Follow Up: [address] [address]  [phone]      Call them when you get home so they can deliver the rest of your Oxygen equipment.     Bryce Longoria, [address]  [address] [facility]  [phone]    In 2 weeks  [facility] Visit      Diet: DIET GENERAL;    Discharge Medications:      Medication List      START taking these medications    budesonide-formoterol 160-4.5 MCG/ACT Aero  Commonly known as:  SYMBICORT  Inhale 2 puffs into the lungs 2 times daily     dexamethasone 0.5 MG tablet  Commonly known as:  DECADRON  Take 4 tablets by mouth every 8 hours for 4 days  Notes to patient:  steroids         CONTINUE taking these medications    allopurinol 300 MG tablet  Commonly known as:  ZYLOPRIM     aspirin 325 MG EC tablet  Take 1 tablet by mouth daily  Start taking on:  December 3, 2020     atenolol-chlorthalidone 100-25 MG per tablet  Commonly known as:  TENORETIC     simvastatin 40 MG tablet  Commonly known as:  ZOCOR     therapeutic multivitamin-minerals tablet           Where to Get Your Medications      You can get these medications from any pharmacy    Bring a paper prescription for each of these medications  · aspirin 325 MG EC tablet  · budesonide-formoterol 160-4.5 MCG/ACT Aero  · dexamethasone 0.5 MG tablet         Time Spent on discharge is  35 mins in patient examination, evaluation, counseling as well as medication reconciliation, prescriptions for required medications, discharge plan and follow up. Electronically signed by Jacob Waller MD on 12/2/2020 at 2:26 PM     Thank you Dr. Jacob Waller MD for the opportunity to be involved in this patient's care.

## 2020-12-02 NOTE — PROGRESS NOTES
Discharge from ID     Perfect serve sent to Dr. Mark Ohara with results of ordered labs and request for discharge if applicable for ID side. Message received ok to Discharge from ID point.

## 2020-12-02 NOTE — PROGRESS NOTES
Home O2 eval completed. Pt maintains adequate saturation at rest on room air. Pt left on room air with cannula running on bed rail. Pt instructed to replace cannula if he becomes short of breath or intends to get out of bed. Pt verbalizes and demonstrates understanding. Remains on continuous pulse oximetry, alarms set appropriately.

## 2020-12-02 NOTE — PLAN OF CARE
Problem: Airway Clearance - Ineffective  Goal: Achieve or maintain patent airway  12/2/2020 1319 by Ernesto Burch RN  Outcome: Ongoing     Problem: Breathing Pattern - Ineffective  Goal: Ability to achieve and maintain a regular respiratory rate  12/2/2020 1319 by Ernesto Burch RN  Outcome: Ongoing, Patient reports some shortness of breath with walking long distances      Problem: Risk for Fluid Volume Deficit  Goal: Maintain normal heart rhythm  12/2/2020 1319 by Ernesto Burch RN  Outcome: Ongoing, Patient has good intake      Problem: Falls - Risk of:  Goal: Will remain free from falls  Description: Will remain free from falls  12/2/2020 1319 by Ernesto Burch RN  Outcome: Ongoing, Patient is alert and oriented, able to make needs known.  Patient is aware of limitations, Patient is short of breath with exertion

## 2020-12-02 NOTE — PROGRESS NOTES
N.O.    Dr. Zachary Duncan called inquiring about patient. Patient stating he feels better. Dr. Lita Ballard.  Caseyiton, CRP, LD and see those results improving possible discharge on PO decadron

## 2020-12-02 NOTE — PROGRESS NOTES
Pulmonary Progress Note  [facility]      Patient - Zohaib Infante,  Age - 60 y.o.    - 1960      Room Number - /-58   N -  [id number]   [facility]  Date of Admission -  2020  7:22 AM        Consulting Georgina Hathaway MD  Primary Care Physician - Kassidy Hernandez MD     SUBJECTIVE   Patient is feeling much better  No fever or chills,   No nausea vomiting or diarrhea  No short of breath wheezing, little dry cough  Room air oxygen sat 90% at rest dropped down to 86% with activity, did qualify for 3 L of oxygen with activity    OBJECTIVE   VITALS    height is 5' 10\" (1.778 m) and weight is 258 lb 13.1 oz (117.4 kg). His oral temperature is 97.3 °F (36.3 °C). His blood pressure is 136/73 and his pulse is 67. His respiration is 18 and oxygen saturation is 90%. Body mass index is 37.14 kg/m². Temperature Range: Temp: 97.3 °F (36.3 °C) Temp  Av.8 °F (36.6 °C)  Min: 97.3 °F (36.3 °C)  Max: 98.3 °F (36.8 °C)  BP Range:  Systolic (52GLA), JRT:275 , Min:124 , ZNT:209     Diastolic (57OIV), LKO:13, Min:73, Max:96    Pulse Range: Pulse  Av.9  Min: 50  Max: 68  Respiration Range: Resp  Av  Min: 18  Max: 18  Current Pulse Ox[de-identified]  SpO2: 90 %  24HR Pulse Ox Range:  SpO2  Av.4 %  Min: 89 %  Max: 95 %  Oxygen Amount and Delivery: O2 Flow Rate (L/min): 3 L/min    Wt Readings from Last 3 Encounters:   20 258 lb 13.1 oz (117.4 kg)   10/05/20 278 lb 9.6 oz (126.4 kg)   20 290 lb (131.5 kg)       I/O (24 Hours)    Intake/Output Summary (Last 24 hours) at 2020 1526  Last data filed at 2020  Gross per 24 hour   Intake 10 ml   Output --   Net 10 ml       EXAM     General Appearance awake and alert, looks comfortable   Head  Normocephalic, without obvious abnormality, atraumatic  Eyes  conjunctivae/corneas clear.  PERRL,   Neck  no adenopathy, no JVD,   Lungs decreased sounds, coarse, no wheezing or distress  Heart: regular rate and rhythm, S1, S2 normal, no murmur, click, rub or gallop  Abdomen  soft, non-tender; bowel sounds normal; no guarding  Extremities mild edema, no calf tenderness or stiffness  Neurologic: Alert and awake, appropriate      MEDS      sodium chloride flush  10 mL Intravenous 2 times per day    enoxaparin  30 mg Subcutaneous BID    remdesivir IVPB  100 mg Intravenous Q24H    dexamethasone  6 mg Intravenous Daily    allopurinol  300 mg Oral Daily    aspirin  325 mg Oral Daily    atorvastatin  40 mg Oral Daily    atenolol  100 mg Oral Daily    And    hydroCHLOROthiazide  25 mg Oral Daily    budesonide-formoterol  2 puff Inhalation BID       sodium chloride flush, acetaminophen, sodium chloride    LABS   CBC   Recent Labs     12/02/20  1101   WBC 9.9   HGB 14.7   HCT 41.9   MCV 94.2        BMP:   Lab Results   Component Value Date     12/02/2020    K 3.8 12/02/2020    CL 99 12/02/2020    CO2 29 12/02/2020    BUN 31 12/02/2020    LABALBU 3.6 12/02/2020    CREATININE 1.42 12/02/2020    CALCIUM 9.5 12/02/2020    GFRAA >60 12/02/2020    LABGLOM 51 12/02/2020     ABGs:No results found for: PHART, PO2ART, MCV0ZOV No results found for: IFIO2, MODE, SETTIDVOL, SETPEEP  Ionized Calcium:  No results found for: IONCA  Magnesium:    Lab Results   Component Value Date    MG 1.8 11/29/2020      Phosphorus:  No results found for: PHOS     LIVER PROFILE   Recent Labs     12/02/20  1101   AST 65*   ALT 95*   BILITOT 0.78   ALKPHOS 56     INR No results for input(s): INR in the last 72 hours. PTT No results for input(s): APTT in the last 72 hours. BNP No results for input(s): BNP in the last 72 hours.     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 Asher Lopez in 3 to 4 weeks      Electronically signed by Tony Chavez MD on 12/2/2020 at 3:26 PM

## 2020-12-02 NOTE — CARE COORDINATION
Writer Faxed Face to Face notes, Home O2 Eval, Face Sheet, DME for Oxygen, to [facility]. Writer informed Yamileth El, from [facility], that pt. Will DC today. Portable tank will be delivered within the next few hours. Pt. Informed to call [facility], when he gets home so they can deliver the rest of the Oxygen equipment. Pt. States Understanding.

## 2020-12-02 NOTE — PROGRESS NOTES
Discharge     Awaiting O2 can delivery. Writer has attempted to call for ETA once and was hungup on.  Writer is currently on hold once again with [address] [place] [address] for a response of ETA    Response:  Yefri Rupali stated it will be awhile, patient will need to wait

## 2020-12-02 NOTE — PROGRESS NOTES
those of syntax and sound a like substitutions which may escape proof reading. It such instances, actual meaningcan be extrapolated by contextual diversion.     Sandie Vásquez MD  Office: [phone]  Perfect serve / office [phone]

## 2020-12-02 NOTE — PROGRESS NOTES
Scripts     Scripts not signed, Scripts called into [facility] per Dr. Jose Roberto Hdz

## 2020-12-03 ENCOUNTER — CARE COORDINATION (OUTPATIENT)
Dept: CASE MANAGEMENT | Age: 60
End: 2020-12-03

## 2020-12-03 NOTE — PROGRESS NOTES
Physician Progress Note      PATIENT:               Hari García  CSN #:                  [id number]  :                       1960  ADMIT DATE:       2020 7:22 AM  DISCH DATE:  RESPONDING  PROVIDER #:        Rosemary Biggs MD          QUERY TEXT:    Pt admitted with COVID-19 pneumonia. Pt noted to have hypoxia. If possible,   please document in the progress notes and discharge summary if you are   evaluating and/or treating any of the following: The medical record reflects the following:  Risk Factors: 60 yr old male, hx vocal cord CA  Clinical Indicators: Per chart-- oxygen saturation was 89% on room air in ED,   was up to 4 L, currently on 3 L, RR 16-32  Treatment: Admit, Pulm consult, initiate oxygen therapy protocol, cont pulse   ox, resp eval & treat, decadron & remdesivir  Options provided:  -- Acute respiratory failure with hypoxia  -- Acute respiratory failure with hypercapnia  -- Other - I will add my own diagnosis  -- Disagree - Not applicable / Not valid  -- Disagree - Clinically unable to determine / Unknown  -- Refer to Clinical Documentation Reviewer    PROVIDER RESPONSE TEXT:    This patient is in acute respiratory failure with hypoxia. Query created by:  Alyssa Mckeon on 2020 6:52 AM      Electronically signed by:  Rosemary Biggs MD 2020 7:55 PM

## 2020-12-04 ENCOUNTER — CARE COORDINATION (OUTPATIENT)
Dept: CASE MANAGEMENT | Age: 60
End: 2020-12-04

## 2020-12-04 NOTE — CARE COORDINATION
Carmen 45 Transitions Initial Follow Up Call    Call within 2 business days of discharge: Yes    Patient: Ronit Charles Patient : 1960   MRN: [id number]  Reason for Admission: covid  Discharge Date: 20 RARS: Readmission Risk Score: 16      Last Discharge [address] [address]       Complaint Diagnosis Description Type Department Provider    20 Fever; Fatigue; Shortness of Breath; Anorexia; Diarrhea COVID-19 . .. ED to Hosp-Admission (Discharged) (ADMITTED) Reilly Stallworth MD; Joby Rand... Date/Time:  2020 3:31 PM  Attempted to reach patient by telephone. Call within 2 business days of discharge: Yes Left HIPPA compliant message requesting a return call. # 2nd attempt- unable to reach patient, covid follow up episode resolved//JU    Facility: [facility]    Non-face-to-face services provided:      Care Transitions 24 Hour Call    Care Transitions Interventions         Follow Up  No future appointments.     Doug Deutsch RN

## 2020-12-18 NOTE — CARE COORDINATION
Writer received a call dom Suárez from [facility] [place]. advising they never received home oxygen order  Facesheet, Face to Face for this patient. Writer Faxed information to [facility] today.     Electronically signed by Guido De La Rosa RN on 12/18/2020 at 11:54 AM

## 2020-12-18 NOTE — CARE COORDINATION
[facility] U. 12. Encounter Date/Time: 2020 Pauly Rubin Account: [de-identified]    MRN: [id number]    Patient: Link People    Contact Serial #: [id number]      ENCOUNTER          Patient Class: I Private Enc? No Unit RM BD: [address] [facility] Service: Intermediate   ADM DX: [id number] [U07.1]   ADM Provider: Lourdes Mensah MD   Procedure:     ATT Provider:     REF Provider:        PATIENT                 Name: Jed Calderon : 1960 (59 yrs)   Address: [address] [place] [address] Sex: Male   City: [address] [place] [address] [address] [address]         Marital Status:    Employer: [employer]         Yazdanism: Other   Primary Care Provider: Lourdes Mensah MD         Primary Phone: [phone]   EMERGENCY CONTACT   Contact Name Legal Guardian? Relationship to Patient Home Phone Work Phone   1. Luh Benitez   *HIPAA*  2. *No Contact Specified*      Spouse    [phone]            GUARANTOR            Guarantor: Iza Benitez     : 1960   Address: [address] [place] [address] Sex: Male     [place] [address]     Relation to Patient: Self       Home Phone: [phone]   Guarantor ID: [id number]       Work Phone:     Guarantor Employer: [employer]         Status: FULL TIME      COVERAGE        PRIMARY INSURANCE   Payor: [employer] Plan: [employer] [address]   Payor Address: [address] [place] [address]       Group Number: [id number] Insurance Type: [address] Name: [facility] : 1960   Subscriber ID: [id number] Pat. Rel. to Sub: Self   SECONDARY INSURANCE   Payor:   Plan:     Payor Address:  ,           Group Number:   Insurance Type:     Subscriber Name:   Subscriber :     Subscriber ID:   Pat.  Rel. to Sub:           [id number]        CSN                                    Req/Control # Roland Sims retrieving Specimen ID]                                   Order Date:  Dec 2, 2020 Plan:  MEDICAL [facility] [address]   Group: [id number]  Worker's Comp Date of Injury:               Sarah Rodriguez RCP   Respiratory Therapist   Respiratory   Progress Notes   Signed   Date of Service:  2020  3:25 PM               Signed             Show:Clear all  [x]Manual[x]Template[]Copied    Added by:  [x]Norris Klein RCP    []Vladimir for details  Home Oxygen Evaluation completed.     Patient is resting on 4 liters per minute via nasal cannula  Resting SpO2 = 92%  Resting SpO2 on room air = 82%     SpO2 on  5L with exercise = 89%        Sarah Rodriguez  3:25 PM              Shabnam Dee MD   Physician   Pulmonology   Progress Notes   Signed   Date of Service:  2020  3:26 PM               Signed        Expand AllCollapse All      Show:Clear all  [x]Manual[x]Template[x]Copied    Added by:  Graciela Thompson MD    []Vladimir for details                                 Pulmonary Progress Note  [facility]        Patient - Fracisco Valencia,  Age - 60 y.o.    - 1960      Room Number - /-15   N -  [id number]   [facility] # - [de-identified]  Date of Admission -  2020  7:22 AM           Consulting Ascencion Dill MD  Primary Care Physician - Louis Peterson MD      SUBJECTIVE   Patient is feeling much better  No fever or chills,   No nausea vomiting or diarrhea  No short of breath wheezing, little dry cough  Room air oxygen sat 90% at rest dropped down to 86% with activity, did qualify for 3 L of oxygen with activity     OBJECTIVE   VITALS    height is 5' 10\" (1.778 m) and weight is 258 lb 13.1 oz (117.4 kg). His oral temperature is 97.3 °F (36.3 °C). His blood pressure is 136/73 and his pulse is 67. His respiration is 18 and oxygen saturation is 90%. Body mass index is 37.14 kg/m².   Temperature Range: Temp: 97.3 °F (36.3 °C) Temp  Av.8 °F (36.6 °C)  Min: 97.3 °F (36.3 °C)  Max: 98.3 °F (36.8 °C) BP Range:  Systolic (29QYB), SGO:474 , Min:124 , NHA:431     Diastolic (98DTO), PEF:18, Min:73, Max:96     Pulse Range: Pulse  Av.9  Min: 50  Max: 68  Respiration Range: Resp  Av  Min: 18  Max: 18  Current Pulse Ox[de-identified]  SpO2: 90 %  24HR Pulse Ox Range:  SpO2  Av.4 %  Min: 89 %  Max: 95 %  Oxygen Amount and Delivery: O2 Flow Rate (L/min): 3 L/min         Wt Readings from Last 3 Encounters:   20 258 lb 13.1 oz (117.4 kg)   10/05/20 278 lb 9.6 oz (126.4 kg)   20 290 lb (131.5 kg)         I/O (24 Hours)     Intake/Output Summary (Last 24 hours) at 2020 1526  Last data filed at 2020      Gross per 24 hour   Intake 10 ml   Output    Net 10 ml         EXAM      General Appearance awake and alert, looks comfortable   Head  Normocephalic, without obvious abnormality, atraumatic  Eyes  conjunctivae/corneas clear.  PERRL,   Neck  no adenopathy, no JVD,   Lungs decreased sounds, coarse, no wheezing or distress  Heart: regular rate and rhythm, S1, S2 normal, no murmur, click, rub or gallop  Abdomen  soft, non-tender; bowel sounds normal; no guarding  Extremities mild edema, no calf tenderness or stiffness  Neurologic: Alert and awake, appropriate        MEDS      Scheduled Medications    sodium chloride flush  10 mL Intravenous 2 times per day    enoxaparin  30 mg Subcutaneous BID    remdesivir IVPB  100 mg Intravenous Q24H    dexamethasone  6 mg Intravenous Daily    allopurinol  300 mg Oral Daily    aspirin  325 mg Oral Daily    atorvastatin  40 mg Oral Daily    atenolol  100 mg Oral Daily     And    hydroCHLOROthiazide  25 mg Oral Daily    budesonide-formoterol  2 puff Inhalation BID         Infusions Meds         PRN Medications   sodium chloride flush, acetaminophen, sodium chloride        LABS   CBC       Recent Labs     20  1101   WBC 9.9   HGB 14.7   HCT 41.9   MCV 94.2         BMP:         Lab Results   Component Value Date      2020

## 2021-01-22 ENCOUNTER — OFFICE VISIT (OUTPATIENT)
Dept: ORTHOPEDIC SURGERY | Age: 61
End: 2021-01-22
Payer: COMMERCIAL

## 2021-01-22 VITALS — BODY MASS INDEX: 36.94 KG/M2 | HEIGHT: 70 IN | WEIGHT: 258 LBS

## 2021-01-22 DIAGNOSIS — S83.241A TEAR OF MEDIAL MENISCUS OF RIGHT KNEE, CURRENT, UNSPECIFIED TEAR TYPE, INITIAL ENCOUNTER: Primary | ICD-10-CM

## 2021-01-22 PROCEDURE — G8427 DOCREV CUR MEDS BY ELIG CLIN: HCPCS | Performed by: ORTHOPAEDIC SURGERY

## 2021-01-22 PROCEDURE — 20610 DRAIN/INJ JOINT/BURSA W/O US: CPT | Performed by: ORTHOPAEDIC SURGERY

## 2021-01-22 PROCEDURE — G8484 FLU IMMUNIZE NO ADMIN: HCPCS | Performed by: ORTHOPAEDIC SURGERY

## 2021-01-22 PROCEDURE — 3017F COLORECTAL CA SCREEN DOC REV: CPT | Performed by: ORTHOPAEDIC SURGERY

## 2021-01-22 PROCEDURE — 99212 OFFICE O/P EST SF 10 MIN: CPT | Performed by: ORTHOPAEDIC SURGERY

## 2021-01-22 PROCEDURE — G8417 CALC BMI ABV UP PARAM F/U: HCPCS | Performed by: ORTHOPAEDIC SURGERY

## 2021-01-22 PROCEDURE — 1036F TOBACCO NON-USER: CPT | Performed by: ORTHOPAEDIC SURGERY

## 2021-01-22 RX ORDER — LIDOCAINE HYDROCHLORIDE 10 MG/ML
2 INJECTION, SOLUTION INFILTRATION; PERINEURAL ONCE
Status: COMPLETED | OUTPATIENT
Start: 2021-01-22 | End: 2021-01-22

## 2021-01-22 RX ORDER — BUPIVACAINE HYDROCHLORIDE 5 MG/ML
2 INJECTION, SOLUTION PERINEURAL ONCE
Status: COMPLETED | OUTPATIENT
Start: 2021-01-22 | End: 2021-01-22

## 2021-01-22 RX ORDER — BETAMETHASONE SODIUM PHOSPHATE AND BETAMETHASONE ACETATE 3; 3 MG/ML; MG/ML
12 INJECTION, SUSPENSION INTRA-ARTICULAR; INTRALESIONAL; INTRAMUSCULAR; SOFT TISSUE ONCE
Status: COMPLETED | OUTPATIENT
Start: 2021-01-22 | End: 2021-01-22

## 2021-01-22 RX ADMIN — BUPIVACAINE HYDROCHLORIDE 10 MG: 5 INJECTION, SOLUTION PERINEURAL at 09:44

## 2021-01-22 RX ADMIN — BETAMETHASONE SODIUM PHOSPHATE AND BETAMETHASONE ACETATE 12 MG: 3; 3 INJECTION, SUSPENSION INTRA-ARTICULAR; INTRALESIONAL; INTRAMUSCULAR; SOFT TISSUE at 09:43

## 2021-01-22 RX ADMIN — LIDOCAINE HYDROCHLORIDE 2 ML: 10 INJECTION, SOLUTION INFILTRATION; PERINEURAL at 09:44

## 2021-01-22 NOTE — PROGRESS NOTES
Jerod Watson returns today. He had status post arthroscopic partial medial meniscectomy of his right knee on 10/5/2020. Patient states that he was doing beautifully up until just about a week ago. Patient states that he was at work and his right knee just gave out on him. He does not recall catching a wall/hole or any problems. Me a little bit of discomfort when he goes up and down stairs. Up until that time however he was doing beautifully    Examination the patient's right knee notes that his portal sites are pristine. He does have a slight effusion although not severe. Motion is 2-130 degrees. I did not able to preach anything on Parisa's whatsoever. Ligamentously is grossly intact no trochanteric findings no IT band findings no calf tenderness negative Homans    An informed verbal consent for the procedure was obtained and risks including, but not limited to: allergy to medications, injection, bleeding, stiffness of joint, recurrence of symptoms, loss of function, swelling, drainage, irrigation, need for surgery and pseudo-septic inflammation, were explained to the patient. Also, discussed was the potential for further injections, irrigation and debridement and surgery. Alternate means of treatment have also been discussed with the patient. Under sterile conditions the patient's right knee was injected with lidocaine 2 cc bupivacaine 2 cc. He was then aspirated. I only got about 9 or 10 cc of fluid out then injected with 2 cc Celestone. Patient tolerated procedure well    This did help the patient how the symptoms are unable to reproduce anything that he had prior to this I did inform the patient to be careful about twisting and turning going up or down stairs.   He should do well with this we will see him back here as needed

## 2022-03-25 ENCOUNTER — HOSPITAL ENCOUNTER (EMERGENCY)
Age: 62
Discharge: HOME | End: 2022-03-25
Attending: EMERGENCY MEDICINE
Payer: COMMERCIAL

## 2022-03-25 VITALS
DIASTOLIC BLOOD PRESSURE: 81 MMHG | WEIGHT: 260 LBS | BODY MASS INDEX: 37.22 KG/M2 | OXYGEN SATURATION: 95 % | HEART RATE: 68 BPM | HEIGHT: 70 IN | RESPIRATION RATE: 18 BRPM | TEMPERATURE: 98.4 F | SYSTOLIC BLOOD PRESSURE: 154 MMHG

## 2022-03-25 DIAGNOSIS — N39.0 URINARY TRACT INFECTION IN MALE: Primary | ICD-10-CM

## 2022-03-25 LAB
BACTERIA: ABNORMAL
BILIRUBIN URINE: NEGATIVE
CASTS UA: ABNORMAL /LPF
COLOR: ABNORMAL
EPITHELIAL CELLS UA: ABNORMAL /HPF
GLUCOSE URINE: NEGATIVE
KETONES, URINE: NEGATIVE
LEUKOCYTE ESTERASE, URINE: ABNORMAL
NITRITE, URINE: POSITIVE
PH UA: 5.5 (ref 5–8)
PROTEIN UA: ABNORMAL
RBC UA: ABNORMAL /HPF
SPECIFIC GRAVITY UA: 1.02 (ref 1–1.03)
TURBIDITY: ABNORMAL
URINE HGB: ABNORMAL
UROBILINOGEN, URINE: NORMAL
WBC UA: ABNORMAL /HPF

## 2022-03-25 PROCEDURE — 87086 URINE CULTURE/COLONY COUNT: CPT

## 2022-03-25 PROCEDURE — 99285 EMERGENCY DEPT VISIT HI MDM: CPT

## 2022-03-25 PROCEDURE — 87186 SC STD MICRODIL/AGAR DIL: CPT

## 2022-03-25 PROCEDURE — 87077 CULTURE AEROBIC IDENTIFY: CPT

## 2022-03-25 PROCEDURE — 6370000000 HC RX 637 (ALT 250 FOR IP): Performed by: EMERGENCY MEDICINE

## 2022-03-25 PROCEDURE — 81001 URINALYSIS AUTO W/SCOPE: CPT

## 2022-03-25 RX ORDER — CIPROFLOXACIN 500 MG/1
500 TABLET, FILM COATED ORAL 2 TIMES DAILY
Qty: 20 TABLET | Refills: 0 | Status: SHIPPED | OUTPATIENT
Start: 2022-03-25 | End: 2022-04-04

## 2022-03-25 RX ORDER — CIPROFLOXACIN 500 MG/1
500 TABLET, FILM COATED ORAL ONCE
Status: COMPLETED | OUTPATIENT
Start: 2022-03-25 | End: 2022-03-25

## 2022-03-25 RX ADMIN — CIPROFLOXACIN 500 MG: 500 TABLET, FILM COATED ORAL at 10:57

## 2022-03-25 ASSESSMENT — ENCOUNTER SYMPTOMS
COLOR CHANGE: 0
BACK PAIN: 1
ABDOMINAL PAIN: 0
TROUBLE SWALLOWING: 0
NAUSEA: 0
BLOOD IN STOOL: 0
VOMITING: 0
SORE THROAT: 0
DIARRHEA: 0
COUGH: 0
SHORTNESS OF BREATH: 0
CONSTIPATION: 0

## 2022-03-25 NOTE — ED TRIAGE NOTES
Patient arrived to ED this morning with c/o body chills, fevers, and urinary frequency. Patient states symptoms started yesterday and has started urinating on himself without noticing it. Patient denies history of UTIs. Patient denies abdominal pain.

## 2022-03-25 NOTE — ED PROVIDER NOTES
[address]  EMERGENCY DEPARTMENT ENCOUNTER    Pt Name: Mello Abdalla  MRN: [id number]  YOB: 1960  Date of evaluation:3/25/22  PCP: Sheila Granger MD    [address]       Chief Complaint   Patient presents with    Urinary Frequency       HISTORY OF PRESENT ILLNESS    Mello Abdalla is a 57 y.o. male who presents with a chief complaint of subjective fevers, chills, increased urinary frequency and back pain. Patient states his symptoms started last night. He states sometimes he felt he had to go to the bathroom but could not make it ended up urinating on himself. No abdominal pain. No numbness, tingling, weakness in the extremities. Symptoms are acute. Symptoms are moderate. Nothing else make symptoms better or worse. Patient has no other complaints at this time. REVIEW OF SYSTEMS       Review of Systems   Constitutional: Negative for chills, fatigue and fever. HENT: Negative for congestion, ear pain, sore throat and trouble swallowing. Eyes: Negative for visual disturbance. Respiratory: Negative for cough and shortness of breath. Cardiovascular: Negative for chest pain, palpitations and leg swelling. Gastrointestinal: Negative for abdominal pain, blood in stool, constipation, diarrhea, nausea and vomiting. Genitourinary: Positive for dysuria, frequency and urgency. Negative for flank pain. Musculoskeletal: Positive for back pain. Negative for arthralgias, myalgias and neck pain. Skin: Negative for color change, rash and wound. Neurological: Negative for dizziness, weakness, light-headedness, numbness and headaches. Psychiatric/Behavioral: Negative for confusion. All other systems reviewed and are negative. Negative in 10 essential Systems except as mentioned above and in the HPI.         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 CURRENT MEDICATIONS       Discharge Medication List as of 3/25/2022 11:52 AM      CONTINUE these medications which have NOT CHANGED    Details   aspirin 325 MG EC tablet Take 1 tablet by mouth daily, Disp-30 tablet,R-3Normal      budesonide-formoterol (SYMBICORT) 160-4.5 MCG/ACT AERO Inhale 2 puffs into the lungs 2 times daily, Disp-1 Inhaler,R-3Normal      Multiple Vitamins-Minerals (THERAPEUTIC MULTIVITAMIN-MINERALS) tablet Take 1 tablet by mouth daily. Historical Med      allopurinol (ZYLOPRIM) 300 MG tablet Take 300 mg by mouth daily. Historical Med      simvastatin (ZOCOR) 40 MG tablet Take 40 mg by mouth nightly. Historical Med      atenolol-chlorthalidone (TENORETIC) 100-25 MG per tablet Take 1 tablet by mouth daily. Historical Med             ALLERGIES     is allergic to ceftin [cefuroxime axetil], doxycycline monohydrate, pcn [penicillins], and sulfa antibiotics. FAMILY HISTORY     He indicated that his mother is . He indicated that his father is . family history includes Cancer in his mother. SOCIAL HISTORY      reports that he has quit smoking. He has a 22.50 pack-year smoking history. He has never used smokeless tobacco. He reports previous alcohol use. He reports that he does not use drugs. PHYSICAL EXAM     INITIAL VITALS:  height is 5' 10\" (1.778 m) and weight is 260 lb (117.9 kg). His oral temperature is 98.4 °F (36.9 °C). His blood pressure is 154/81 (abnormal) and his pulse is 68. His respiration is 18 and oxygen saturation is 95%. Physical Exam  Vitals and nursing note reviewed. Constitutional:       General: He is not in acute distress.   HENT: Head: Normocephalic and atraumatic. Eyes:      Conjunctiva/sclera: Conjunctivae normal.      Pupils: Pupils are equal, round, and reactive to light. Cardiovascular:      Rate and Rhythm: Normal rate and regular rhythm. Heart sounds: Normal heart sounds. No murmur heard. Pulmonary:      Effort: Pulmonary effort is normal. No respiratory distress. Breath sounds: Normal breath sounds. Abdominal:      General: Bowel sounds are normal. There is no distension. Palpations: Abdomen is soft. Tenderness: There is no abdominal tenderness. There is no right CVA tenderness or left CVA tenderness. Musculoskeletal:         General: No tenderness. Cervical back: Neck supple. Lymphadenopathy:      Cervical: No cervical adenopathy. Skin:     General: Skin is warm and dry. Findings: No rash. Neurological:      Mental Status: He is alert and oriented to person, place, and time. Psychiatric:         Judgment: Judgment normal.           DIFFERENTIAL DIAGNOSIS/MDM:   19-year-old male presents with 1 day of urinary symptoms as above. Currently is afebrile, nontoxic, normal vital signs. No acute distress. He is alert and orient x4 with a GCS 15. Grossly normal neurologic exam.  Low suspicion for cauda equina syndrome. Suspect UTI versus kidney stone. We will get urinalysis.     DIAGNOSTIC RESULTS     EKG: All EKG's are interpreted by the Emergency Department Physician who either signs or Co-signs this chart in the absence of a cardiologist.        RADIOLOGY:   I directly visualized the following  images and reviewed the radiologist interpretations:  No orders to display           ED BEDSIDE ULTRASOUND:      LABS:  Labs Reviewed   URINALYSIS WITH REFLEX TO CULTURE - Abnormal; Notable for the following components:       Result Value    Color, UA Dark Yellow (*)     Turbidity UA Turbid (*)     Urine Hgb LARGE (*)     Protein, UA 1+ (*)     Nitrite, Urine POSITIVE (*)     Leukocyte Esterase, Urine LARGE (*)     All other components within normal limits   MICROSCOPIC URINALYSIS - Abnormal; Notable for the following components:    Bacteria, UA FEW (*)     All other components within normal limits   CULTURE, URINE         EMERGENCY DEPARTMENT COURSE:   Vitals:    Vitals:    03/25/22 1012 03/25/22 1019 03/25/22 1206   BP: (!) 146/83 (!) 124/106 (!) 154/81   Pulse: 74 74 68   Resp: 18 18 18   Temp: 98.4 °F (36.9 °C)     TempSrc: Oral     SpO2: 99% 96% 95%   Weight: 260 lb (117.9 kg)     Height: 5' 10\" (1.778 m)       Patient found to have significant urinary tract infection. He appears very well otherwise. He has no fever, vital signs are normal here. I think he is appropriate to treat at home with oral antibiotics. Given first dose of ciprofloxacin here. Advised return if any symptoms worsen or if he develops a fever. He is agreeable plan will be discharged home today. CRITICALCARE:      CONSULTS:  None      PROCEDURES:      FINAL IMPRESSION      1. Urinary tract infection in male            DISPOSITION/PLAN   DISPOSITION Decision To Discharge 03/25/2022 11:51:26 AM          PATIENT REFERRED TO:  Danica Palma MD  [address] [place] [address]  [phone]    Schedule an appointment as soon as possible for a visit       [facility] ED  [facility] [address]  [phone]    As needed, If symptoms worsen      DISCHARGE MEDICATIONS:  Discharge Medication List as of 3/25/2022 11:52 AM      START taking these medications    Details   ciprofloxacin (CIPRO) 500 MG tablet Take 1 tablet by mouth 2 times daily for 10 days, Disp-20 tablet, R-0Print             The care is provided during an unprecedented national emergency due to the novel coronavirus, COVID-19.     (Please note that portions ofthis note were completed with a voice recognition program.  Efforts were made to edit the dictations but occasionally words are mis-transcribed.)    Rylan Peace DO  Attending Emergency Physician          Ascencion Catalan DO  03/25/22 8903

## 2022-03-26 LAB
CULTURE: ABNORMAL
SPECIMEN DESCRIPTION: ABNORMAL

## 2022-07-20 ENCOUNTER — HOSPITAL ENCOUNTER (EMERGENCY)
Age: 62
Discharge: HOME | End: 2022-07-20
Attending: EMERGENCY MEDICINE
Payer: COMMERCIAL

## 2022-07-20 VITALS
RESPIRATION RATE: 16 BRPM | HEART RATE: 70 BPM | SYSTOLIC BLOOD PRESSURE: 167 MMHG | OXYGEN SATURATION: 97 % | DIASTOLIC BLOOD PRESSURE: 80 MMHG | TEMPERATURE: 99.1 F

## 2022-07-20 DIAGNOSIS — U07.1 COVID: Primary | ICD-10-CM

## 2022-07-20 LAB
ABSOLUTE EOS #: 0 K/UL (ref 0–0.4)
ABSOLUTE LYMPH #: 1 K/UL (ref 1–4.8)
ABSOLUTE MONO #: 0.9 K/UL (ref 0.1–1.3)
ANION GAP SERPL CALCULATED.3IONS-SCNC: 12 MMOL/L (ref 9–17)
BASOPHILS # BLD: 1 % (ref 0–2)
BASOPHILS ABSOLUTE: 0 K/UL (ref 0–0.2)
BUN BLDV-MCNC: 17 MG/DL (ref 8–23)
CALCIUM SERPL-MCNC: 9.3 MG/DL (ref 8.6–10.4)
CHLORIDE BLD-SCNC: 98 MMOL/L (ref 98–107)
CO2: 24 MMOL/L (ref 20–31)
CREAT SERPL-MCNC: 1.19 MG/DL (ref 0.7–1.2)
EOSINOPHILS RELATIVE PERCENT: 0 % (ref 0–4)
GFR AFRICAN AMERICAN: >60 ML/MIN
GFR NON-AFRICAN AMERICAN: >60 ML/MIN
GFR SERPL CREATININE-BSD FRML MDRD: ABNORMAL ML/MIN/{1.73_M2}
GLUCOSE BLD-MCNC: 123 MG/DL (ref 70–99)
HCT VFR BLD CALC: 42.1 % (ref 41–53)
HEMOGLOBIN: 13.9 G/DL (ref 13.5–17.5)
INFLUENZA A: NOT DETECTED
INFLUENZA B: NOT DETECTED
LYMPHOCYTES # BLD: 12 % (ref 24–44)
MCH RBC QN AUTO: 32.5 PG (ref 26–34)
MCHC RBC AUTO-ENTMCNC: 33.1 G/DL (ref 31–37)
MCV RBC AUTO: 98.2 FL (ref 80–100)
MONOCYTES # BLD: 12 % (ref 1–7)
PDW BLD-RTO: 14.8 % (ref 11.5–14.9)
PLATELET # BLD: 168 K/UL (ref 150–450)
PMV BLD AUTO: 7.8 FL (ref 6–12)
POTASSIUM SERPL-SCNC: 3.9 MMOL/L (ref 3.7–5.3)
RBC # BLD: 4.29 M/UL (ref 4.5–5.9)
SARS-COV-2 RNA, RT PCR: DETECTED
SEG NEUTROPHILS: 75 % (ref 36–66)
SEGMENTED NEUTROPHILS ABSOLUTE COUNT: 6.1 K/UL (ref 1.3–9.1)
SODIUM BLD-SCNC: 134 MMOL/L (ref 135–144)
SOURCE: ABNORMAL
SPECIMEN DESCRIPTION: ABNORMAL
TROPONIN, HIGH SENSITIVITY: 17 NG/L (ref 0–22)
WBC # BLD: 8.1 K/UL (ref 3.5–11)

## 2022-07-20 PROCEDURE — 87636 SARSCOV2 & INF A&B AMP PRB: CPT

## 2022-07-20 PROCEDURE — 85025 COMPLETE CBC W/AUTO DIFF WBC: CPT

## 2022-07-20 PROCEDURE — 84484 ASSAY OF TROPONIN QUANT: CPT

## 2022-07-20 PROCEDURE — 99284 EMERGENCY DEPT VISIT MOD MDM: CPT

## 2022-07-20 PROCEDURE — 93005 ELECTROCARDIOGRAM TRACING: CPT | Performed by: EMERGENCY MEDICINE

## 2022-07-20 PROCEDURE — 36415 COLL VENOUS BLD VENIPUNCTURE: CPT

## 2022-07-20 PROCEDURE — 80048 BASIC METABOLIC PNL TOTAL CA: CPT

## 2022-07-20 RX ORDER — GUAIFENESIN/DEXTROMETHORPHAN 100-10MG/5
5 SYRUP ORAL 3 TIMES DAILY PRN
Qty: 120 ML | Refills: 0 | Status: SHIPPED | OUTPATIENT
Start: 2022-07-20 | End: 2022-07-30

## 2022-07-20 NOTE — LETTER
[facility] ED  [address] [address]  Phone: [phone]             July 20, 2022    Patient: Shahnaz Gomez   YOB: 1960   Date of Visit: 7/20/2022       To Whom It May Concern:    Gustavo Daugherty was seen and treated in our emergency department on 7/20/2022. He may return to work on August 1, 2022.       Sincerely,             Signature:__________________________________

## 2022-07-20 NOTE — ED PROVIDER NOTES
[address]  EMERGENCY DEPARTMENT ENCOUNTER      Pt Name: Pamela Carrasquillo  MRN: [id number]  [place] 1960  Date of evaluation: 7/20/22      CHIEF COMPLAINT       Chief Complaint   Patient presents with    Fever    Headache    Cough     HISTORY OF PRESENT ILLNESS   HPI 57 y.o. male presents with concerns for COVID 19. The patient started getting sick on Monday afternoon (7/18/22). The patient is vaccinated against Covid 19. Also received the booster. No known sick contacts. Primary symptom is fatigue, cough, sore throat. Symptoms are rated as severe in severity, constant in course. The patient tried drinking lots of fluids prior to arrival with some relief of symptoms. On review of symptoms, the patient reports associated with chills, fatigue. The patient denies associated chest pain, sob. REVIEW OF SYSTEMS       10 systems are reviewed and are negative except for those noted in the HPI.      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    Gout     Hyperlipidemia     Hypertension     Obesity        SURGICAL HISTORY       Past Surgical History:   Procedure Laterality Date    FOOT SURGERY Bilateral     bone spurs, ingrown toenails    HERNIA REPAIR      umbilical    KNEE ARTHROSCOPY Right 10/05/2020    partial menisectomy    KNEE ARTHROSCOPY Right 10/5/2020    KNEE ARTHROSCOPY RIGHT KNEE WITH PARTIAL MENISCECTOMY performed by Lizzy Cai MD at [address] [place] [address]  09/23/2014    with biopsies of vocal cords    OTHER SURGICAL HISTORY  10-29-14    direct laryngoscopy    TONSILLECTOMY         CURRENT MEDICATIONS       Discharge Medication List as of 7/20/2022 11:04 AM        CONTINUE these medications which have NOT CHANGED    Details   aspirin 325 MG EC tablet Take 1 tablet by mouth daily, Disp-30 tablet,R-3Normal      budesonide-formoterol (SYMBICORT) 160-4.5 MCG/ACT AERO Inhale 2 puffs into the lungs 2 times daily, Disp-1 Inhaler,R-3Normal      Multiple Vitamins-Minerals (THERAPEUTIC MULTIVITAMIN-MINERALS) tablet Take 1 tablet by mouth daily. Historical Med      allopurinol (ZYLOPRIM) 300 MG tablet Take 300 mg by mouth daily. Historical Med      simvastatin (ZOCOR) 40 MG tablet Take 40 mg by mouth nightly. Historical Med      atenolol-chlorthalidone (TENORETIC) 100-25 MG per tablet Take 1 tablet by mouth daily. Historical Med             ALLERGIES     is allergic to ceftin [cefuroxime axetil], doxycycline monohydrate, pcn [penicillins], and sulfa antibiotics. FAMILY HISTORY     He indicated that his mother is . He indicated that his father is . SOCIAL HISTORY      reports that he has quit smoking. He has a 22.50 pack-year smoking history. He has never used smokeless tobacco. He reports that he does not currently use alcohol. He reports that he does not use drugs. PHYSICAL EXAM     INITIAL VITALS: BP (!) 167/80   Pulse 70   Temp 99.1 °F (37.3 °C)   Resp 16   SpO2 97%   Gen: NAD  Head: NC/at  Eyes: PERRL, anicteric, no conjunctivitis. ENT: TM clear bilaterally. Pharynx is non-erythematous. No tonsillar hypertrophy or exudates, uvula is midline. No evidence of a pharyngeal abscess. Neck: No adenopathy. No JVD. No stridor  CVS: RRR  PULM: CTA  ABD: Soft, no TTP  MSK: Normal muscle bulk. No CVA TTTP  SKIN: No rash. Neuro: A&Ox3, appropriate movement of all extremities, ambulatory with a normal gait, fluent speech. Extremities: No lower extremity edema. Appropriate capillary refill. MEDICAL DECISION MAKING:   This is a middle-aged gentleman presenting with generalized weakness fatigue cough. Patient most likely has a viral upper respiratory infection. We will check him for COVID influenza. Given his age and cardiac risk factors will rule out any atypical presentation of ACS.   Will make sure he is not anemic and make sure he does not have any significant electrolyte abnormalities or renal failure. Emergency Department course:  No acs  No anemia. Covid +  Started on Paxlovid. The patient is nontoxic and well appearing, no evidence of hypoxia or impending respiratory failure. The patient is tolerating PO. I do not feel the patient has evidence of significant dehydration or end organ failure at this time. I do not feel the patient has an emergent medical condition at this time. The patient is referred to appropriate outpatient follow up through their PCP or [facility]. I discussed with the patient home isolation measures, anticipatory guidance, discharge instructions, and to return immediately for worsening of symptoms. The patient is agreeable with this plan. DIAGNOSTIC RESULTS     EKG: All EKG's are interpreted by the Emergency Department Physician who either signs or Co-signs this chart in the absence of a cardiologist.    EKG shows a sinus rhythm. HR is 65, , , , no SMOOTH, No STD, TWI, the axis is leftwards. LABS: All lab results were reviewed by myself, and all abnormals are listed below.   Labs Reviewed   COVID-19 & INFLUENZA COMBO - Abnormal; Notable for the following components:       Result Value    SARS-CoV-2 RNA, RT PCR DETECTED (*)     All other components within normal limits   CBC WITH AUTO DIFFERENTIAL - Abnormal; Notable for the following components:    RBC 4.29 (*)     Seg Neutrophils 75 (*)     Lymphocytes 12 (*)     Monocytes 12 (*)     All other components within normal limits   BASIC METABOLIC PANEL - Abnormal; Notable for the following components:    Glucose 123 (*)     Sodium 134 (*)     All other components within normal limits   TROPONIN       EMERGENCY DEPARTMENT COURSE:   Vitals:    Vitals:    07/20/22 0835 07/20/22 0836   BP: (!) 167/80    Pulse:  70   Resp:  16   Temp:  99.1 °F (37.3 °C)   SpO2: 94% 97%       The patient was given the following medications while in the

## 2022-07-20 NOTE — ED NOTES
RN at bedside to discuss discharge instructions. Patient verbalized understanding and agrees to the plan of care. RN answered all questions. Patient ambulatory & appears to be in no distress.  Pt agrees to follow up with pcp or return to ED if symptoms worsen        Nicole Urbano RN  07/20/22 6639

## 2022-07-21 ENCOUNTER — CARE COORDINATION (OUTPATIENT)
Dept: CARE COORDINATION | Age: 62
End: 2022-07-21

## 2022-07-21 LAB
EKG ATRIAL RATE: 65 BPM
EKG P AXIS: 27 DEGREES
EKG P-R INTERVAL: 198 MS
EKG Q-T INTERVAL: 404 MS
EKG QRS DURATION: 110 MS
EKG QTC CALCULATION (BAZETT): 420 MS
EKG R AXIS: -87 DEGREES
EKG T AXIS: 39 DEGREES
EKG VENTRICULAR RATE: 65 BPM

## 2022-07-21 NOTE — CARE COORDINATION
After Visit Summary   10/23/2018    Madelin Story    MRN: [id number]           Patient Information     Date Of Birth          1956        Visit Information        Provider Department      10/23/2018 2:00 PM Elvira Ching AuD M [facility] Audiology        Today's Diagnoses     Dizziness and giddiness    -  1       Follow-ups after your visit        Future tests that were ordered for you today     Open Future Orders        Priority Expected Expires Ordered    AUDIOLOGY ADULT REFERRAL Routine  4/21/2019 10/23/2018            Who to contact     Please call your clinic at [phone] to:    Ask questions about your health    Make or cancel appointments    Discuss your medicines    Learn about your test results    Speak to your doctor            Additional Information About Your Visit        Care EveryWhere ID     This is your Care EveryWhere ID. This could be used by other organizations to access your [facility] medical records  [id number]         Blood Pressure from Last 3 Encounters:   10/24/08 120/78   06/25/07 110/70   01/12/07 130/80    Weight from Last 3 Encounters:   10/02/18 55.3 kg (122 lb)   08/07/18 55.3 kg (122 lb)   10/24/08 58.5 kg (129 lb)              We Performed the Following     AUDIOGRAM/TYMPANOGRAM - INTERFACE     [facility] Audiometry Thrshld Eval & Speech Recog ([id number])     Tymps / Reflex   ([id number])        Primary Care Provider    None Specified       No primary provider on file.        Equal Access to Services     [facility] : Hadii aad ku hadasho Soomaali, waaxda luqadaha, qaybta kaalmada adeegyada, waxay sarah torres. So [facility] [phone].    ATENCIÓN: Si habla español, tiene a giron disposición servicios gratuitos de asistencia lingüística. Llame al [phone].    We comply with applicable federal civil rights laws and [place] laws. We do not discriminate on the basis of race, color, national origin, age, disability, sex, sexual orientation, or gender  Left voicemail message to return call to [phone] for ED/Covid outreach call. identity.            Thank you!     Thank you for choosing [facility] AUDIOLOGY  for your care. Our goal is always to provide you with excellent care. Hearing back from our patients is one way we can continue to improve our services. Please take a few minutes to complete the written survey that you may receive in the mail after your visit with us. Thank you!             Your Updated Medication List - Protect others around you: Learn how to safely use, store and throw away your medicines at www.disposemymeds.org.          This list is accurate as of 10/23/18 11:59 PM.  Always use your most recent med list.                   Brand Name Dispense Instructions for use Diagnosis    amLODIPine 5 MG tablet    NORVASC     Take 5 mg by mouth        clonazePAM 0.5 MG tablet    klonoPIN     Take 0.5 mg by mouth        dexamethasone 24 MG/ML injection    DECADRON    1 mL    1 mL (24 mg) by INTRATYMPANIC route once    Meniere's disease, left       estradiol 10 MCG Tabs vaginal tablet    VAGIFEM     Place 10 mcg vaginally        hydrochlorothiazide 25 MG tablet    HYDRODIURIL     Take 25 mg by mouth        ibuprofen 200 MG tablet    ADVIL/MOTRIN    120    2-3  hs        lidocaine 5 % Patch    LIDODERM     PLACE 1 PATCH ONTO THE SKIN EVERY 24 HOURS. (ON 12 AND OFF FOR 12 HRS)        pregabalin 75 MG capsule    LYRICA     Take 75 mg by mouth        traMADol 50 MG tablet    ULTRAM     Take 50 mg by mouth        traZODone 50 MG tablet    DESYREL    3 MONTHS    1 1/2 Q HS    Localized osteoarthrosis not specified whether primary or secondary, upper arm, Sleep disturbance, unspecified       venlafaxine 225 MG Tb24 24 hr tablet    EFFEXOR-ER     Take 225 mg by mouth

## 2022-07-22 NOTE — CARE COORDINATION
Patient contacted regarding COVID-19 diagnosis. Discussed COVID-19 related testing which was available at this time. Test results were positive. Patient informed of results, if available? Yes. LPN Care Coordinator contacted the patient by telephone to perform post discharge assessment. Call within 2 business days of discharge: Yes. Verified name and  with patient as identifiers. Provided introduction to self, and explanation of the CTN/ACM role, and reason for call due to risk factors for infection and/or exposure to COVID-19. Symptoms reviewed with patient who verbalized the following symptoms: no new symptoms  no worsening symptoms. Due to no new or worsening symptoms encounter was not routed to provider for escalation. Discussed follow-up appointments. If no appointment was previously scheduled, appointment scheduling offered: No.  [facility] follow up appointment(s): No future appointments. Non-[facility] follow up appointment(s): no    Non-face-to-face services provided:  Obtained and reviewed discharge summary and/or continuity of care documents     Advance Care Planning:   Does patient have an Advance Directive:  reviewed and current. Educated patient about risk for severe COVID-19 due to risk factors according to CDC guidelines. LPN CC reviewed discharge instructions, medical action plan and red flag symptoms with the patient who verbalized understanding. Discussed COVID vaccination status: No. Education provided on COVID-19 vaccination as appropriate. Discussed exposure protocols and quarantine with CDC Guidelines. Patient was given an opportunity to verbalize any questions and concerns and agrees to contact LPN CC or health care provider for questions related to their healthcare. Reviewed and educated patient on any new and changed medications related to discharge diagnosis     Was patient discharged with a pulse oximeter? no      LPN CC provided contact information.  Plan for follow-up call in 5-7 days based on severity of symptoms and risk factors.

## 2022-07-22 NOTE — ACP (ADVANCE CARE PLANNING)
Advance Care Planning   Healthcare Decision Maker:    Primary Decision Maker: Cleo Kilgore - [phone]    Click here to complete Healthcare Decision Makers including selection of the Healthcare Decision Maker Relationship (ie \"Primary\"). Today we documented Decision Maker(s) consistent with Legal Next of Kin hierarchy.

## 2022-07-28 ENCOUNTER — CARE COORDINATION (OUTPATIENT)
Dept: CARE COORDINATION | Age: 62
End: 2022-07-28

## 2022-07-28 NOTE — CARE COORDINATION
Patient contacted regarding COVID-19 risk and diagnosis. Discussed COVID-19 related testing which was available at this time. Test results were positive. Patient informed of results, if available? Yes    LPN Care Coordinator contacted the patient by telephone to perform follow-up assessment. Verified name and  with patient as identifiers. Patient has following risk factors of:  CA, obesity . Symptoms reviewed with patient who verbalized the following symptoms: no new symptoms  no worsening symptoms. Due to no new or worsening symptoms encounter was not routed to provider for escalation. Educated patient about risk for severe COVID-19 due to risk factors according to CDC guidelines. LPN CC reviewed discharge instructions, medical action plan and red flag symptoms with the patient who verbalized understanding. Discussed COVID vaccination status: No. Education provided on COVID-19 vaccination as appropriate. Discussed exposure protocols and quarantine with CDC Guidelines. Patient was given an opportunity to verbalize any questions and concerns and agrees to contact LPN CC or health care provider for questions related to their healthcare. Was patient discharged with a pulse oximeter? no    LPN CC provided contact information. No further follow-up call identified based on severity of symptoms and risk factors.

## 2024-05-01 ENCOUNTER — HOSPITAL ENCOUNTER (INPATIENT)
Age: 64
LOS: 2 days | Discharge: HOME | End: 2024-05-03
Attending: EMERGENCY MEDICINE | Admitting: FAMILY MEDICINE
Payer: COMMERCIAL

## 2024-05-01 DIAGNOSIS — K92.2 GASTROINTESTINAL HEMORRHAGE, UNSPECIFIED GASTROINTESTINAL HEMORRHAGE TYPE: Primary | ICD-10-CM

## 2024-05-01 PROBLEM — K62.5 RECTAL BLEED: Status: ACTIVE | Noted: 2024-05-01

## 2024-05-01 LAB
ALBUMIN SERPL-MCNC: 4.2 G/DL (ref 3.5–5.2)
ALP SERPL-CCNC: 89 U/L (ref 40–129)
ALT SERPL-CCNC: 14 U/L (ref 5–41)
ANION GAP SERPL CALCULATED.3IONS-SCNC: 14 MMOL/L (ref 9–17)
AST SERPL-CCNC: 18 U/L
BACTERIA URNS QL MICRO: ABNORMAL
BASOPHILS # BLD: 0 K/UL (ref 0–0.2)
BASOPHILS NFR BLD: 0 % (ref 0–2)
BILIRUB DIRECT SERPL-MCNC: 0.3 MG/DL
BILIRUB INDIRECT SERPL-MCNC: 0.7 MG/DL (ref 0–1)
BILIRUB SERPL-MCNC: 1 MG/DL (ref 0.3–1.2)
BILIRUB UR QL STRIP: NEGATIVE
BUN SERPL-MCNC: 21 MG/DL (ref 8–23)
CALCIUM SERPL-MCNC: 9.8 MG/DL (ref 8.6–10.4)
CASTS #/AREA URNS LPF: ABNORMAL /LPF
CHLORIDE SERPL-SCNC: 99 MMOL/L (ref 98–107)
CLARITY UR: CLEAR
CO2 SERPL-SCNC: 23 MMOL/L (ref 20–31)
COLOR UR: YELLOW
CREAT SERPL-MCNC: 1.3 MG/DL (ref 0.7–1.2)
EOSINOPHIL # BLD: 0.1 K/UL (ref 0–0.4)
EOSINOPHILS RELATIVE PERCENT: 1 % (ref 0–4)
EPI CELLS #/AREA URNS HPF: ABNORMAL /HPF
ERYTHROCYTE [DISTWIDTH] IN BLOOD BY AUTOMATED COUNT: 15.2 % (ref 11.5–14.9)
GFR, ESTIMATED: 61 ML/MIN/1.73M2
GLUCOSE SERPL-MCNC: 119 MG/DL (ref 70–99)
GLUCOSE UR STRIP-MCNC: NEGATIVE MG/DL
HCT VFR BLD AUTO: 43.5 % (ref 41–53)
HGB BLD-MCNC: 14.1 G/DL (ref 13.5–17.5)
HGB UR QL STRIP.AUTO: NEGATIVE
INR PPP: 1.6
KETONES UR STRIP-MCNC: NEGATIVE MG/DL
LEUKOCYTE ESTERASE UR QL STRIP: ABNORMAL
LIPASE SERPL-CCNC: 14 U/L (ref 13–60)
LYMPHOCYTES NFR BLD: 1.7 K/UL (ref 1–4.8)
LYMPHOCYTES RELATIVE PERCENT: 14 % (ref 24–44)
MCH RBC QN AUTO: 32.1 PG (ref 26–34)
MCHC RBC AUTO-ENTMCNC: 32.5 G/DL (ref 31–37)
MCV RBC AUTO: 98.8 FL (ref 80–100)
MONOCYTES NFR BLD: 1.1 K/UL (ref 0.1–1.3)
MONOCYTES NFR BLD: 9 % (ref 1–7)
NEUTROPHILS NFR BLD: 76 % (ref 36–66)
NEUTS SEG NFR BLD: 8.8 K/UL (ref 1.3–9.1)
NITRITE UR QL STRIP: NEGATIVE
PH UR STRIP: 5.5 [PH] (ref 5–8)
PLATELET # BLD AUTO: 191 K/UL (ref 150–450)
PMV BLD AUTO: 8.1 FL (ref 6–12)
POTASSIUM SERPL-SCNC: 3.8 MMOL/L (ref 3.7–5.3)
PROT SERPL-MCNC: 7.2 G/DL (ref 6.4–8.3)
PROT UR STRIP-MCNC: NEGATIVE MG/DL
PROTHROMBIN TIME: 19.3 SEC (ref 11.8–14.6)
RBC # BLD AUTO: 4.4 M/UL (ref 4.5–5.9)
RBC #/AREA URNS HPF: ABNORMAL /HPF
SODIUM SERPL-SCNC: 136 MMOL/L (ref 135–144)
SP GR UR STRIP: 1.01 (ref 1–1.03)
UROBILINOGEN UR STRIP-ACNC: NORMAL EU/DL (ref 0–1)
WBC #/AREA URNS HPF: ABNORMAL /HPF
WBC OTHER # BLD: 11.7 K/UL (ref 3.5–11)

## 2024-05-01 PROCEDURE — 2580000003 HC RX 258: Performed by: EMERGENCY MEDICINE

## 2024-05-01 PROCEDURE — 6360000002 HC RX W HCPCS: Performed by: EMERGENCY MEDICINE

## 2024-05-01 PROCEDURE — 81001 URINALYSIS AUTO W/SCOPE: CPT

## 2024-05-01 PROCEDURE — 1200000000 HC SEMI PRIVATE

## 2024-05-01 PROCEDURE — 80048 BASIC METABOLIC PNL TOTAL CA: CPT

## 2024-05-01 PROCEDURE — 80076 HEPATIC FUNCTION PANEL: CPT

## 2024-05-01 PROCEDURE — 85025 COMPLETE CBC W/AUTO DIFF WBC: CPT

## 2024-05-01 PROCEDURE — A4216 STERILE WATER/SALINE, 10 ML: HCPCS | Performed by: EMERGENCY MEDICINE

## 2024-05-01 PROCEDURE — 96374 THER/PROPH/DIAG INJ IV PUSH: CPT

## 2024-05-01 PROCEDURE — C9113 INJ PANTOPRAZOLE SODIUM, VIA: HCPCS | Performed by: EMERGENCY MEDICINE

## 2024-05-01 PROCEDURE — 85610 PROTHROMBIN TIME: CPT

## 2024-05-01 PROCEDURE — 99285 EMERGENCY DEPT VISIT HI MDM: CPT

## 2024-05-01 PROCEDURE — 83690 ASSAY OF LIPASE: CPT

## 2024-05-01 PROCEDURE — 36415 COLL VENOUS BLD VENIPUNCTURE: CPT

## 2024-05-01 RX ORDER — ANTIOX #8/OM3/DHA/EPA/LUT/ZEAX 250-2.5 MG
1 CAPSULE ORAL DAILY
COMMUNITY

## 2024-05-01 RX ORDER — CHLORTHALIDONE 25 MG/1
25 TABLET ORAL DAILY
Status: DISCONTINUED | OUTPATIENT
Start: 2024-05-01 | End: 2024-05-03 | Stop reason: HOSPADM

## 2024-05-01 RX ORDER — ATORVASTATIN CALCIUM 20 MG/1
20 TABLET, FILM COATED ORAL DAILY
Status: DISCONTINUED | OUTPATIENT
Start: 2024-05-01 | End: 2024-05-03 | Stop reason: HOSPADM

## 2024-05-01 RX ORDER — ATENOLOL AND CHLORTHALIDONE TABLET 100; 25 MG/1; MG/1
1 TABLET ORAL DAILY
Status: DISCONTINUED | OUTPATIENT
Start: 2024-05-01 | End: 2024-05-01

## 2024-05-01 RX ORDER — ATENOLOL 50 MG/1
100 TABLET ORAL DAILY
Status: DISCONTINUED | OUTPATIENT
Start: 2024-05-01 | End: 2024-05-03 | Stop reason: HOSPADM

## 2024-05-01 RX ORDER — ALLOPURINOL 300 MG/1
300 TABLET ORAL DAILY
Status: DISCONTINUED | OUTPATIENT
Start: 2024-05-01 | End: 2024-05-03 | Stop reason: HOSPADM

## 2024-05-01 RX ADMIN — PANTOPRAZOLE SODIUM 80 MG: 40 INJECTION, POWDER, FOR SOLUTION INTRAVENOUS at 18:35

## 2024-05-01 RX ADMIN — PANTOPRAZOLE SODIUM 8 MG/HR: 40 INJECTION, POWDER, FOR SOLUTION INTRAVENOUS at 18:41

## 2024-05-01 ASSESSMENT — ENCOUNTER SYMPTOMS
BLOOD IN STOOL: 1
VOMITING: 0
COLOR CHANGE: 0
PHOTOPHOBIA: 0
SHORTNESS OF BREATH: 0
HEMATOCHEZIA: 1
DIARRHEA: 0
COUGH: 0
ABDOMINAL PAIN: 1
TROUBLE SWALLOWING: 0
NAUSEA: 0

## 2024-05-01 ASSESSMENT — PAIN SCALES - GENERAL: PAINLEVEL_OUTOF10: 3

## 2024-05-01 ASSESSMENT — LIFESTYLE VARIABLES
HOW MANY STANDARD DRINKS CONTAINING ALCOHOL DO YOU HAVE ON A TYPICAL DAY: 1 OR 2
HOW OFTEN DO YOU HAVE A DRINK CONTAINING ALCOHOL: MONTHLY OR LESS

## 2024-05-01 ASSESSMENT — PAIN - FUNCTIONAL ASSESSMENT: PAIN_FUNCTIONAL_ASSESSMENT: 0-10

## 2024-05-01 NOTE — ED PROVIDER NOTES
the ER.  GI was consulted and they did agree to assess and treat the patient in the hospital.  The patient was started on Protonix drip and was admitted after speaking with the admitting team.  Patient was made n.p.o. at midnight.  Patient and family understand and agree with the plan.    CRITICAL CARE:       PROCEDURES:    Procedures      DATA FOR LAB AND RADIOLOGY TESTS ORDERED BELOW ARE REVIEWED BY THE ED CLINICIAN:    RADIOLOGY: All x-rays, CT, MRI, and formal ultrasound images (except ED bedside ultrasound) are read by the radiologist, see reports below, unless otherwise noted in MDM or here.  Reports below are reviewed by myself.  No orders to display       LABS: Lab orders shown below, the results are reviewed by myself, and all abnormals are listed below.  Labs Reviewed   URINALYSIS WITH REFLEX TO CULTURE - Abnormal; Notable for the following components:       Result Value    Leukocyte Esterase, Urine TRACE (*)     All other components within normal limits   PROTIME-INR - Abnormal; Notable for the following components:    Protime 19.3 (*)     All other components within normal limits   HEPATIC FUNCTION PANEL - Abnormal; Notable for the following components:    Bilirubin, Direct 0.3 (*)     All other components within normal limits   BASIC METABOLIC PANEL - Abnormal; Notable for the following components:    Glucose 119 (*)     Creatinine 1.3 (*)     All other components within normal limits   CBC WITH AUTO DIFFERENTIAL - Abnormal; Notable for the following components:    WBC 11.7 (*)     RBC 4.40 (*)     RDW 15.2 (*)     Neutrophils % 76 (*)     Lymphocytes % 14 (*)     Monocytes % 9 (*)     All other components within normal limits   MICROSCOPIC URINALYSIS - Abnormal; Notable for the following components:    WBC, UA 6 TO 9 (*)     Casts UA 0 TO 2 (*)     All other components within normal limits   LIPASE   CBC WITH AUTO DIFFERENTIAL       Vitals Reviewed:    Vitals:    05/01/24 1411 05/01/24 1845 05/01/24  1913 05/01/24 1928   BP: 125/72 138/86 (!) 142/78 (!) 146/80   Pulse: 55 57 55 53   Resp: 18 10 16 15   Temp: 97.9 °F (36.6 °C)      TempSrc: Oral      SpO2: 95% 96% 95% 97%   Weight: 117.9 kg (260 lb)      Height: 1.778 m (5' 10\")        MEDICATIONS GIVEN TO PATIENT THIS ENCOUNTER:  Orders Placed This Encounter   Medications    pantoprazole (PROTONIX) 80 mg in sodium chloride (PF) 0.9 % 20 mL injection    pantoprazole (PROTONIX) 80 mg in sodium chloride 0.9 % 100 mL infusion    allopurinol (ZYLOPRIM) tablet 300 mg    atenolol-chlorthalidone (TENORETIC) 100-25 MG per tablet 1 tablet     Order Specific Question:   Please select a reason the therapeutic interchange was not accepted:     Answer:   Okay for Pharmacy to Substitute with Components    atorvastatin (LIPITOR) tablet 20 mg     DISCHARGE PRESCRIPTIONS:  Current Discharge Medication List        PHYSICIAN CONSULTS ORDERED THIS ENCOUNTER:  IP CONSULT TO GI  IP CONSULT TO INTERNAL MEDICINE  FINAL IMPRESSION      1. Gastrointestinal hemorrhage, unspecified gastrointestinal hemorrhage type          DISPOSITION/PLAN   DISPOSITION Admitted 05/01/2024 06:35:48 PM      OUTPATIENT FOLLOW UP THE PATIENT:  No follow-up provider specified.    DO Shukri Dunaway Sami, DO  05/01/24 2026

## 2024-05-01 NOTE — ED TRIAGE NOTES
Mode of arrival (squad #, walk in, police, etc) : Walk in        Chief complaint(s): Abdominal pain, rectal bleeding        Arrival Note (brief scenario, treatment PTA, etc).: Pt arrives to ED c/o abdominal pain that started last night. Patient reports loose stool with blood in it.

## 2024-05-01 NOTE — PROGRESS NOTES
Pharmacy Medication History Note      List of current medications patient is taking is complete.     Source of information: patient, dispense report, OARRS    Changes made to medication list:  Medications flagged for removal (include reason, ex. noncompliance):  Symbicort - patient reports not taking     Medications removed (include reason, ex. therapy complete or physician discontinued):  None     Medications added/doses adjusted:  Preservision AREDS 2 take 1 capsule daily     Other notes (ex. Recent course of antibiotics, Coumadin dosing):  OARRS negative     Denies use of other OTC or herbal medications.      Allergies clarified    Medication list provided to the patient: none   Medication education provided to the patient: none    Prior to Admission Medications   Prescriptions Last Dose Informant Patient Reported? Taking?   Multiple Vitamins-Minerals (PRESERVISION AREDS 2) CAPS 5/1/2024  Yes Yes   Sig: Take 1 capsule by mouth daily   Multiple Vitamins-Minerals (THERAPEUTIC MULTIVITAMIN-MINERALS) tablet 5/1/2024  Yes No   Sig: Take 1 tablet by mouth daily   allopurinol (ZYLOPRIM) 300 MG tablet 5/1/2024  Yes No   Sig: Take 1 tablet by mouth daily   aspirin 325 MG EC tablet 5/1/2024  No No   Sig: Take 1 tablet by mouth daily   atenolol-chlorthalidone (TENORETIC) 100-25 MG per tablet 5/1/2024  Yes No   Sig: Take 1 tablet by mouth daily   budesonide-formoterol (SYMBICORT) 160-4.5 MCG/ACT AERO Not Taking  No No   Sig: Inhale 2 puffs into the lungs 2 times daily   Patient not taking: Reported on 5/1/2024   simvastatin (ZOCOR) 40 MG tablet 5/1/2024  Yes No   Sig: Take 1 tablet by mouth nightly      Facility-Administered Medications: None           Electronically signed by Asiya Coombs RPH on 5/1/2024 at 7:23 PM

## 2024-05-01 NOTE — ED NOTES
Report given to SHUKRI Marshall from Med-Surg.   Report method by phone   The following was reviewed with receiving RN:   Current vital signs:  BP (!) 146/80   Pulse 53   Temp 97.9 °F (36.6 °C) (Oral)   Resp 15   Ht 1.778 m (5' 10\")   Wt 117.9 kg (260 lb)   SpO2 97%   BMI 37.31 kg/m²                MEWS Score: 1     Any medication or safety alerts were reviewed. Any pending diagnostics and notifications were also reviewed, as well as any safety concerns or issues, abnormal labs, abnormal imaging, and abnormal assessment findings. Questions were answered.

## 2024-05-02 PROBLEM — R19.7 DIARRHEA: Status: ACTIVE | Noted: 2024-05-02

## 2024-05-02 PROBLEM — R10.9 ABDOMINAL PAIN: Status: ACTIVE | Noted: 2024-05-02

## 2024-05-02 PROBLEM — K92.2 GASTROINTESTINAL HEMORRHAGE: Status: ACTIVE | Noted: 2024-05-02

## 2024-05-02 LAB
BASOPHILS # BLD: 0 K/UL (ref 0–0.2)
BASOPHILS NFR BLD: 0 % (ref 0–2)
EOSINOPHIL # BLD: 0.2 K/UL (ref 0–0.4)
EOSINOPHILS RELATIVE PERCENT: 2 % (ref 0–4)
ERYTHROCYTE [DISTWIDTH] IN BLOOD BY AUTOMATED COUNT: 15.3 % (ref 11.5–14.9)
HCT VFR BLD AUTO: 42 % (ref 41–53)
HGB BLD-MCNC: 13.8 G/DL (ref 13.5–17.5)
LYMPHOCYTES NFR BLD: 2.1 K/UL (ref 1–4.8)
LYMPHOCYTES RELATIVE PERCENT: 21 % (ref 24–44)
MCH RBC QN AUTO: 32.7 PG (ref 26–34)
MCHC RBC AUTO-ENTMCNC: 32.9 G/DL (ref 31–37)
MCV RBC AUTO: 99.3 FL (ref 80–100)
MONOCYTES NFR BLD: 1.2 K/UL (ref 0.1–1.3)
MONOCYTES NFR BLD: 13 % (ref 1–7)
NEUTROPHILS NFR BLD: 64 % (ref 36–66)
NEUTS SEG NFR BLD: 6.3 K/UL (ref 1.3–9.1)
PLATELET # BLD AUTO: 184 K/UL (ref 150–450)
PMV BLD AUTO: 8.5 FL (ref 6–12)
RBC # BLD AUTO: 4.23 M/UL (ref 4.5–5.9)
WBC OTHER # BLD: 9.8 K/UL (ref 3.5–11)

## 2024-05-02 PROCEDURE — 6360000002 HC RX W HCPCS: Performed by: EMERGENCY MEDICINE

## 2024-05-02 PROCEDURE — 6370000000 HC RX 637 (ALT 250 FOR IP): Performed by: FAMILY MEDICINE

## 2024-05-02 PROCEDURE — 85025 COMPLETE CBC W/AUTO DIFF WBC: CPT

## 2024-05-02 PROCEDURE — APPNB60 APP NON BILLABLE TIME 46-60 MINS: Performed by: NURSE PRACTITIONER

## 2024-05-02 PROCEDURE — 1200000000 HC SEMI PRIVATE

## 2024-05-02 PROCEDURE — 99254 IP/OBS CNSLTJ NEW/EST MOD 60: CPT | Performed by: INTERNAL MEDICINE

## 2024-05-02 PROCEDURE — 36415 COLL VENOUS BLD VENIPUNCTURE: CPT

## 2024-05-02 PROCEDURE — C9113 INJ PANTOPRAZOLE SODIUM, VIA: HCPCS | Performed by: EMERGENCY MEDICINE

## 2024-05-02 PROCEDURE — 2580000003 HC RX 258: Performed by: EMERGENCY MEDICINE

## 2024-05-02 RX ORDER — PANTOPRAZOLE SODIUM 40 MG/1
40 TABLET, DELAYED RELEASE ORAL
Status: DISCONTINUED | OUTPATIENT
Start: 2024-05-03 | End: 2024-05-03 | Stop reason: HOSPADM

## 2024-05-02 RX ADMIN — ATORVASTATIN CALCIUM 20 MG: 40 TABLET, FILM COATED ORAL at 10:42

## 2024-05-02 RX ADMIN — CHLORTHALIDONE 25 MG: 25 TABLET ORAL at 10:42

## 2024-05-02 RX ADMIN — PANTOPRAZOLE SODIUM 8 MG/HR: 40 INJECTION, POWDER, FOR SOLUTION INTRAVENOUS at 03:00

## 2024-05-02 RX ADMIN — ALLOPURINOL 300 MG: 300 TABLET ORAL at 10:42

## 2024-05-02 RX ADMIN — ATENOLOL 100 MG: 50 TABLET ORAL at 10:42

## 2024-05-02 NOTE — PLAN OF CARE
Problem: Discharge Planning  Goal: Discharge to home or other facility with appropriate resources  Outcome: Progressing  Flowsheets  Taken 5/2/2024 0432  Discharge to home or other facility with appropriate resources:   Identify barriers to discharge with patient and caregiver   Arrange for needed discharge resources and transportation as appropriate   Identify discharge learning needs (meds, wound care, etc)   Refer to discharge planning if patient needs post-hospital services based on physician order or complex needs related to functional status, cognitive ability or social support system  Taken 5/1/2024 2114  Discharge to home or other facility with appropriate resources:   Identify barriers to discharge with patient and caregiver   Identify discharge learning needs (meds, wound care, etc)   Refer to discharge planning if patient needs post-hospital services based on physician order or complex needs related to functional status, cognitive ability or social support system   Arrange for needed discharge resources and transportation as appropriate  Note: Inform pt. Of discharge teaching and planned. Instructed pt. To inform me if further teaching need to be done.      Problem: Safety - Adult  Goal: Free from fall injury  Outcome: Progressing  Flowsheets (Taken 5/2/2024 0432)  Free From Fall Injury:   Instruct family/caregiver on patient safety   Based on caregiver fall risk screen, instruct family/caregiver to ask for assistance with transferring infant if caregiver noted to have fall risk factors  Note: Made sure call light was in reach, a clear pathway and adequate lighting was provided. Also made sure that the pt. Was wearing non-slip socks.

## 2024-05-02 NOTE — PLAN OF CARE
Problem: Discharge Planning  Goal: Discharge to home or other facility with appropriate resources  5/2/2024 1557 by Samantha Moses, RN  Outcome: Progressing     Problem: Pain  Goal: Verbalizes/displays adequate comfort level or baseline comfort level  Outcome: Progressing     Problem: Safety - Adult  Goal: Free from fall injury  5/2/2024 1557 by Samantha Moses, RN  Outcome: Progressing

## 2024-05-02 NOTE — CARE COORDINATION
Case Management Assessment  Initial Evaluation    Date/Time of Evaluation: 5/2/2024 10:15 AM  Assessment Completed by: Diamond Sanchez RN    If patient is discharged prior to next notation, then this note serves as note for discharge by case management.    Patient Name: Hilario Benitez                   YOB: 1960  Diagnosis: Rectal bleed [K62.5]                   Date / Time: 5/1/2024  3:14 PM    Patient Admission Status: Inpatient   Readmission Risk (Low < 19, Mod (19-27), High > 27): Readmission Risk Score: 7.5    Current PCP: Trell Keenan MD  PCP verified by CM? Yes    Chart Reviewed: Yes      History Provided by: Patient  Patient Orientation: Alert and Oriented    Patient Cognition: Alert    Hospitalization in the last 30 days (Readmission):  No    If yes, Readmission Assessment in  Navigator will be completed.    Advance Directives:      Code Status: Full Code   Patient's Primary Decision Maker is: Legal Next of Kin    Primary Decision Maker: Harpreet Benitez  [place] - [phone]    Discharge Planning:    Patient lives with: Children Type of Home: House  Primary Care Giver: Self  Patient Support Systems include: Children, Family Members   Current Financial resources: None  Current community resources: None  Current services prior to admission: None            Current DME:              Type of Home Care services:  None    ADLS  Prior functional level: Independent in ADLs/IADLs  Current functional level: Independent in ADLs/IADLs    PT AM-PAC:   /24  OT AM-PAC:   /24    Family can provide assistance at DC: Yes  Would you like Case Management to discuss the discharge plan with any other family members/significant others, and if so, who? No  Plans to Return to Present Housing: Yes  Other Identified Issues/Barriers to RETURNING to current housing: NA  Potential Assistance needed at discharge: N/A            Potential DME:    Patient expects to discharge to: House  Plan for transportation

## 2024-05-02 NOTE — CONSULTS
Gastroenterology Consult Note    Patient:   Hilario Benitez   Admit date:  5/1/2024  Facility:   [facility]  Referring/PCP: Trell Keenan MD  Date:     5/2/2024  Consultant:   ALL Carey - MALENA, Luciana Vasquez MD    Subjective:     This 63 y.o. male was admitted 5/1/2024 with a diagnosis of \"Rectal bleed [K62.5]\" and is seen in consultation regarding   Chief Complaint   Patient presents with    Abdominal Pain    Rectal Bleeding     63-year-old male with past medical history of hyperlipidemia, hypertension, obesity, gout, arthritis presents to ED with rectal bleeding.  Onset Wednesday afternoon.  Patient reports that initially he had some lower abdominal cramping.  He reports having a formed bowel movement.   followed by diarrhea with bright red blood.  Denies any melena.  Patient denies any associated nausea, vomiting, fevers, chills.  Does report some abdominal bloating, increased flatus.  Lower abdominal pain improved after bowel movement.  Denies any hx of GI bleed.   On daily aspirin  Denies any NSAIDs.  He reports he had a normal colonoscopy 2-3 years ago at [facility].  No reports available.  No epigastric pain, dysphagia, odnyophagia.  No unexplained weight loss.    On admission hgb 14.1.  Today hgb 13.8.  Patient had BM this morning that was still loose but he states the stools was brown.  He noted a small amount of bright red blood when wiping.    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

## 2024-05-02 NOTE — PROGRESS NOTES
Pt arrive to the floor and place in room 2046. Pt is alert x 4. Assessment and Admission question completed. Pt stated that he already took all his meds for today before coming to the ER. Writer didn't give pt his schedule meds. Call light and bedside table in reach. No s/s of distress note at this time. Plan of care ongoing.

## 2024-05-03 VITALS
SYSTOLIC BLOOD PRESSURE: 121 MMHG | HEIGHT: 70 IN | HEART RATE: 57 BPM | WEIGHT: 260 LBS | BODY MASS INDEX: 37.22 KG/M2 | OXYGEN SATURATION: 93 % | RESPIRATION RATE: 17 BRPM | DIASTOLIC BLOOD PRESSURE: 64 MMHG | TEMPERATURE: 98.4 F

## 2024-05-03 PROCEDURE — 6370000000 HC RX 637 (ALT 250 FOR IP): Performed by: NURSE PRACTITIONER

## 2024-05-03 PROCEDURE — 6370000000 HC RX 637 (ALT 250 FOR IP): Performed by: FAMILY MEDICINE

## 2024-05-03 RX ADMIN — ATORVASTATIN CALCIUM 20 MG: 40 TABLET, FILM COATED ORAL at 08:14

## 2024-05-03 RX ADMIN — ATENOLOL 100 MG: 50 TABLET ORAL at 08:14

## 2024-05-03 RX ADMIN — ALLOPURINOL 300 MG: 300 TABLET ORAL at 08:14

## 2024-05-03 RX ADMIN — PANTOPRAZOLE SODIUM 40 MG: 40 TABLET, DELAYED RELEASE ORAL at 06:16

## 2024-05-03 RX ADMIN — CHLORTHALIDONE 25 MG: 25 TABLET ORAL at 08:14

## 2024-05-03 NOTE — PLAN OF CARE
Problem: Discharge Planning  Goal: Discharge to home or other facility with appropriate resources  5/3/2024 0939 by Brooklyn Robb RN  Outcome: Completed  5/2/2024 2042 by Alem Hollingsworth RN  Outcome: Progressing  Flowsheets (Taken 5/2/2024 2000)  Discharge to home or other facility with appropriate resources:   Identify barriers to discharge with patient and caregiver   Arrange for needed discharge resources and transportation as appropriate   Identify discharge learning needs (meds, wound care, etc)     Problem: Pain  Goal: Verbalizes/displays adequate comfort level or baseline comfort level  5/3/2024 0939 by Brooklyn oRbb, SHUKRI  Outcome: Completed  5/2/2024 2042 by Alem Hollingsworth, RN  Outcome: Progressing     Problem: Safety - Adult  Goal: Free from fall injury  5/3/2024 0939 by Brooklyn Robb RN  Outcome: Completed  5/2/2024 2042 by Alem Hollingsworth, RN  Outcome: Progressing

## 2024-05-03 NOTE — PLAN OF CARE
Problem: Discharge Planning  Goal: Discharge to home or other facility with appropriate resources  5/2/2024 2042 by Alem Hollingsworth, RN  Outcome: Progressing  Flowsheets (Taken 5/2/2024 2000)  Discharge to home or other facility with appropriate resources:   Identify barriers to discharge with patient and caregiver   Arrange for needed discharge resources and transportation as appropriate   Identify discharge learning needs (meds, wound care, etc)  5/2/2024 1557 by Samantha Moses, RN  Outcome: Progressing     Problem: Pain  Goal: Verbalizes/displays adequate comfort level or baseline comfort level  5/2/2024 2042 by Alem Hollingsworth, RN  Outcome: Progressing  5/2/2024 1557 by Samantha Moses, RN  Outcome: Progressing     Problem: Safety - Adult  Goal: Free from fall injury  5/2/2024 2042 by Alem Hollingsworth, RN  Outcome: Progressing  5/2/2024 1557 by Samantha Moses, RN  Outcome: Progressing

## 2024-05-03 NOTE — DISCHARGE SUMMARY
[address] [place] [address]                            DISCHARGE SUMMARY      PATIENT NAME: NONI ISLAS           : 1960  MED REC NO: [id number]                          ROOM:   ACCOUNT NO: [id number]                       ADMIT DATE: 2024  PROVIDER: Abel Abdullahi MD      CHIEF COMPLAINT:  Rectal bleeding.    SECONDARY DIAGNOSES:  Include abdominal pain and discomfort, hypokalemia, and essential hypertension.    HOSPITAL COURSE:  This 63-year-old male was admitted and Gastroenterology was consulted about his recent onset of diarrhea and rectal bleeding.  He was maintained on clear fluids until he was seen by GI due to the fact that he had recent colonoscopy.  They opted for a more conservative course, especially in lieu of the fact that he had no more bleeding since admission.  His hemoglobin just dropped about 0.3 to 0.4 g from admission.  Likewise, he was having no diarrhea or abdominal pain of any significance since admission.  The only thing he complained of was some intestinal gas.  He is being discharged to home to follow up in a week.  Diet on discharge is as tolerated.  Home medications were unchanged.  He is being discharged in good condition.  He is to follow up with Dr. Abdullahi in 1 week.          ABEL ABDULLAHI MD      D:  2024 08:16:55     T:  2024 10:09:44     ABELARDO/KURT  Job #:  [id number]     Doc#:  [id number]

## 2024-05-03 NOTE — PROGRESS NOTES
Patient discharged to home per orders.  IV discontinued intact.  Discharge instructions reviewed written and verbal. Patient states all belongs are present.  Taken per wheelchair to awaiting vehicle.

## 2024-05-03 NOTE — CARE COORDINATION
ONGOING DISCHARGE PLAN:    Patient is alert and oriented x4.    Spoke with patient regarding discharge plan and patient confirms that plan is still to go home with son.     DME: none    VNS: none, pt denies needs.     HGB 13.8 today     Will continue to follow for additional discharge needs.    If patient is discharged prior to next notation, then this note serves as note for discharge by case management.    Electronically signed by Lala Schmitt RN on 5/3/2024 at 11:05 AM

## 2025-06-03 DIAGNOSIS — Z79.2 NEED FOR ANTIBIOTIC PROPHYLAXIS FOR DENTAL PROCEDURE: Primary | ICD-10-CM

## 2025-06-03 RX ORDER — CLINDAMYCIN HYDROCHLORIDE 150 MG/1
CAPSULE ORAL
Qty: 12 CAPSULE | Refills: 2 | Status: SHIPPED | OUTPATIENT
Start: 2025-06-03

## (undated) DEVICE — SINGLE PORT MANIFOLD: Brand: NEPTUNE 2

## (undated) DEVICE — GLOVE SURG SZ 8 L12IN FNGR THK13MIL BRN LTX SYN POLYMER W

## (undated) DEVICE — DISPOSABLE TOURNIQUET CUFF SINGLE BLADDER, SINGLE PORT AND QUICK CONNECT CONNECTOR: Brand: COLOR CUFF

## (undated) DEVICE — GLOVE SURG SZ 85 L12IN FNGR THK13MIL BRN LTX SYN POLYMER W

## (undated) DEVICE — GLOVE SURG SZ 5.5 L12IN FNGR THK9.4MIL STD WHT LTX FREE

## (undated) DEVICE — SOLUTION IV IRRIG LACTATED RINGERS 3000ML 2B7487

## (undated) DEVICE — GLOVE ORANGE PI 8   MSG9080

## (undated) DEVICE — DRAPE,ARTHRO,W/POUCH,STERILE: Brand: MEDLINE

## (undated) DEVICE — STRAP,POSITIONING,KNEE/BODY,FOAM,4X60": Brand: MEDLINE

## (undated) DEVICE — PADDING UNDERCAST W6INXL4YD WYTEX 6 PER BG

## (undated) DEVICE — DRAPE,U/ SHT,SPLIT,PLAS,STERIL: Brand: MEDLINE

## (undated) DEVICE — STOCKINETTE,IMPERVIOUS,12X48,STERILE: Brand: MEDLINE

## (undated) DEVICE — BANDAGE,ELASTIC,ESMARK,STERILE,6"X9',LF: Brand: MEDLINE

## (undated) DEVICE — GLOVE ORANGE PI 7 1/2   MSG9075

## (undated) DEVICE — GOWN,AURORA,NONREINFORCED,LARGE: Brand: MEDLINE

## (undated) DEVICE — BANDAGE COMPR W6INXL5YD SELF ADH COHESIVE CO FLX

## (undated) DEVICE — STERLING XTRASHARP SHAVER GREAT WHITE SHAVER BLADE, 4.2 MM: Brand: STERLING XTRASHARP SHAVER GREAT WHITE

## (undated) DEVICE — DRAPE,REIN 53X77,STERILE: Brand: MEDLINE

## (undated) DEVICE — SUTURE ETHLN SZ 3-0 L18IN NONABSORBABLE BLK FS-1 L24MM 3/8 663H

## (undated) DEVICE — CHLORAPREP 26ML ORANGE

## (undated) DEVICE — 4-PORT MANIFOLD: Brand: NEPTUNE 2

## (undated) DEVICE — BANDAGE COMPR M W6INXL10YD WHT BGE VELC E MTRX HK AND LOOP

## (undated) DEVICE — MHPB ARTHROSCOPY PACK: Brand: MEDLINE INDUSTRIES, INC.

## (undated) DEVICE — DRESSING,GAUZE,XEROFORM,CURAD,1"X8",ST: Brand: CURAD

## (undated) DEVICE — GLOVE ORANGE PI 7   MSG9070

## (undated) DEVICE — PACK ARTHRO W PCH

## (undated) DEVICE — SOLUTION IV IRRIG POUR BRL 0.9% SODIUM CHL 2F7124

## (undated) DEVICE — TUBING, SUCTION, 3/16" X 10', STRAIGHT: Brand: MEDLINE